# Patient Record
Sex: MALE | Race: WHITE | NOT HISPANIC OR LATINO | Employment: OTHER | URBAN - METROPOLITAN AREA
[De-identification: names, ages, dates, MRNs, and addresses within clinical notes are randomized per-mention and may not be internally consistent; named-entity substitution may affect disease eponyms.]

---

## 2019-07-15 ENCOUNTER — OFFICE VISIT (OUTPATIENT)
Dept: CARDIOLOGY | Facility: CLINIC | Age: 59
End: 2019-07-15

## 2019-07-15 VITALS
SYSTOLIC BLOOD PRESSURE: 138 MMHG | HEIGHT: 68 IN | WEIGHT: 237.2 LBS | RESPIRATION RATE: 18 BRPM | BODY MASS INDEX: 35.95 KG/M2 | DIASTOLIC BLOOD PRESSURE: 88 MMHG | HEART RATE: 70 BPM

## 2019-07-15 DIAGNOSIS — I10 ESSENTIAL HYPERTENSION: ICD-10-CM

## 2019-07-15 DIAGNOSIS — Z95.1 HX OF CABG: ICD-10-CM

## 2019-07-15 DIAGNOSIS — I25.10 CORONARY ARTERY DISEASE INVOLVING NATIVE CORONARY ARTERY OF NATIVE HEART WITHOUT ANGINA PECTORIS: ICD-10-CM

## 2019-07-15 DIAGNOSIS — I48.0 PAROXYSMAL ATRIAL FIBRILLATION (HCC): ICD-10-CM

## 2019-07-15 DIAGNOSIS — Z02.4 ENCOUNTER FOR CDL (COMMERCIAL DRIVING LICENSE) EXAM: Primary | ICD-10-CM

## 2019-07-15 DIAGNOSIS — E78.2 MIXED HYPERLIPIDEMIA: ICD-10-CM

## 2019-07-15 PROBLEM — I48.91 A-FIB (HCC): Status: ACTIVE | Noted: 2017-01-01

## 2019-07-15 PROCEDURE — 99203 OFFICE O/P NEW LOW 30 MIN: CPT | Performed by: INTERNAL MEDICINE

## 2019-07-15 RX ORDER — LEVOTHYROXINE SODIUM 0.07 MG/1
75 TABLET ORAL DAILY
COMMUNITY
End: 2020-02-07 | Stop reason: SDUPTHER

## 2019-07-15 RX ORDER — ATORVASTATIN CALCIUM 40 MG/1
40 TABLET, FILM COATED ORAL DAILY
COMMUNITY
End: 2020-02-07 | Stop reason: SDUPTHER

## 2019-07-15 RX ORDER — LISINOPRIL 5 MG/1
5 TABLET ORAL DAILY
Qty: 30 TABLET | Refills: 11 | Status: SHIPPED | OUTPATIENT
Start: 2019-07-15 | End: 2020-02-07 | Stop reason: SDUPTHER

## 2019-07-15 RX ORDER — ASPIRIN 81 MG/1
81 TABLET ORAL DAILY
COMMUNITY

## 2019-07-15 RX ORDER — LISINOPRIL 2.5 MG/1
2.5 TABLET ORAL DAILY
COMMUNITY
End: 2019-07-15 | Stop reason: DRUGHIGH

## 2019-07-16 ENCOUNTER — HOSPITAL ENCOUNTER (OUTPATIENT)
Dept: CARDIOLOGY | Facility: HOSPITAL | Age: 59
Discharge: HOME | End: 2019-07-16
Admitting: INTERNAL MEDICINE

## 2019-07-16 VITALS
HEIGHT: 68 IN | BODY MASS INDEX: 35.92 KG/M2 | WEIGHT: 237 LBS | SYSTOLIC BLOOD PRESSURE: 132 MMHG | DIASTOLIC BLOOD PRESSURE: 82 MMHG

## 2019-07-16 LAB
BH CV ECHO MEAS - ACS: 2 CM
BH CV ECHO MEAS - AO MAX PG (FULL): 2.2 MMHG
BH CV ECHO MEAS - AO MAX PG: 5.6 MMHG
BH CV ECHO MEAS - AO MEAN PG (FULL): 0.81 MMHG
BH CV ECHO MEAS - AO MEAN PG: 2.6 MMHG
BH CV ECHO MEAS - AO ROOT AREA (BSA CORRECTED): 1.4
BH CV ECHO MEAS - AO ROOT AREA: 7.4 CM^2
BH CV ECHO MEAS - AO ROOT DIAM: 3.1 CM
BH CV ECHO MEAS - AO V2 MAX: 118 CM/SEC
BH CV ECHO MEAS - AO V2 MEAN: 76 CM/SEC
BH CV ECHO MEAS - AO V2 VTI: 23.2 CM
BH CV ECHO MEAS - ASC AORTA: 3.1 CM
BH CV ECHO MEAS - AVA(I,A): 3.2 CM^2
BH CV ECHO MEAS - AVA(I,D): 3.2 CM^2
BH CV ECHO MEAS - AVA(V,A): 2.7 CM^2
BH CV ECHO MEAS - AVA(V,D): 2.7 CM^2
BH CV ECHO MEAS - BSA(HAYCOCK): 2.3 M^2
BH CV ECHO MEAS - BSA: 2.2 M^2
BH CV ECHO MEAS - BZI_BMI: 35.7 KILOGRAMS/M^2
BH CV ECHO MEAS - BZI_METRIC_HEIGHT: 172.7 CM
BH CV ECHO MEAS - BZI_METRIC_WEIGHT: 106.6 KG
BH CV ECHO MEAS - EDV(CUBED): 88.8 ML
BH CV ECHO MEAS - EDV(MOD-SP2): 64.5 ML
BH CV ECHO MEAS - EDV(MOD-SP4): 87.3 ML
BH CV ECHO MEAS - EDV(TEICH): 90.6 ML
BH CV ECHO MEAS - EF(CUBED): 63.4 %
BH CV ECHO MEAS - EF(MOD-BP): 56 %
BH CV ECHO MEAS - EF(MOD-SP2): 57.5 %
BH CV ECHO MEAS - EF(MOD-SP4): 53.2 %
BH CV ECHO MEAS - EF(TEICH): 55 %
BH CV ECHO MEAS - ESV(CUBED): 32.5 ML
BH CV ECHO MEAS - ESV(MOD-SP2): 27.5 ML
BH CV ECHO MEAS - ESV(MOD-SP4): 40.8 ML
BH CV ECHO MEAS - ESV(TEICH): 40.7 ML
BH CV ECHO MEAS - FS: 28.4 %
BH CV ECHO MEAS - IVS/LVPW: 1.1
BH CV ECHO MEAS - IVSD: 1.2 CM
BH CV ECHO MEAS - LA DIMENSION(2D): 3.7 CM
BH CV ECHO MEAS - LA DIMENSION: 3.6 CM
BH CV ECHO MEAS - LA/AO: 1.2
BH CV ECHO MEAS - LV DIASTOLIC VOL/BSA (35-75): 39.9 ML/M^2
BH CV ECHO MEAS - LV MASS(C)D: 177.4 GRAMS
BH CV ECHO MEAS - LV MASS(C)DI: 81 GRAMS/M^2
BH CV ECHO MEAS - LV MAX PG: 3.4 MMHG
BH CV ECHO MEAS - LV MEAN PG: 1.8 MMHG
BH CV ECHO MEAS - LV SYSTOLIC VOL/BSA (12-30): 18.7 ML/M^2
BH CV ECHO MEAS - LV V1 MAX: 92 CM/SEC
BH CV ECHO MEAS - LV V1 MEAN: 63.9 CM/SEC
BH CV ECHO MEAS - LV V1 VTI: 21 CM
BH CV ECHO MEAS - LVIDD: 4.5 CM
BH CV ECHO MEAS - LVIDS: 3.2 CM
BH CV ECHO MEAS - LVOT AREA: 3.5 CM^2
BH CV ECHO MEAS - LVOT DIAM: 2.1 CM
BH CV ECHO MEAS - LVPWD: 1.1 CM
BH CV ECHO MEAS - MV A MAX VEL: 63.1 CM/SEC
BH CV ECHO MEAS - MV DEC SLOPE: 310.6 CM/SEC^2
BH CV ECHO MEAS - MV DEC TIME: 0.2 SEC
BH CV ECHO MEAS - MV E MAX VEL: 32.8 CM/SEC
BH CV ECHO MEAS - MV E/A: 0.52
BH CV ECHO MEAS - MV MAX PG: 3.4 MMHG
BH CV ECHO MEAS - MV MEAN PG: 1.3 MMHG
BH CV ECHO MEAS - MV V2 MAX: 91.9 CM/SEC
BH CV ECHO MEAS - MV V2 MEAN: 52.2 CM/SEC
BH CV ECHO MEAS - MV V2 VTI: 24.5 CM
BH CV ECHO MEAS - MVA(VTI): 3 CM^2
BH CV ECHO MEAS - PA ACC TIME: 0.12 SEC
BH CV ECHO MEAS - PA MAX PG (FULL): 2.5 MMHG
BH CV ECHO MEAS - PA MAX PG: 4.8 MMHG
BH CV ECHO MEAS - PA PR(ACCEL): 25 MMHG
BH CV ECHO MEAS - PA V2 MAX: 109.2 CM/SEC
BH CV ECHO MEAS - PULM A REVS VEL: 26 CM/SEC
BH CV ECHO MEAS - PULM DIAS VEL: 51.2 CM/SEC
BH CV ECHO MEAS - PULM S/D: 1.2
BH CV ECHO MEAS - PULM SYS VEL: 61.1 CM/SEC
BH CV ECHO MEAS - PVA(V,A): 3.1 CM^2
BH CV ECHO MEAS - PVA(V,D): 3.1 CM^2
BH CV ECHO MEAS - QP/QS: 1.1
BH CV ECHO MEAS - RV MAX PG: 2.3 MMHG
BH CV ECHO MEAS - RV MEAN PG: 1.4 MMHG
BH CV ECHO MEAS - RV V1 MAX: 75.9 CM/SEC
BH CV ECHO MEAS - RV V1 MEAN: 56.1 CM/SEC
BH CV ECHO MEAS - RV V1 VTI: 17.8 CM
BH CV ECHO MEAS - RVDD: 3.1 CM
BH CV ECHO MEAS - RVOT AREA: 4.5 CM^2
BH CV ECHO MEAS - RVOT DIAM: 2.4 CM
BH CV ECHO MEAS - SI(AO): 78.1 ML/M^2
BH CV ECHO MEAS - SI(CUBED): 25.7 ML/M^2
BH CV ECHO MEAS - SI(LVOT): 33.6 ML/M^2
BH CV ECHO MEAS - SI(MOD-SP2): 16.9 ML/M^2
BH CV ECHO MEAS - SI(MOD-SP4): 21.2 ML/M^2
BH CV ECHO MEAS - SI(TEICH): 22.8 ML/M^2
BH CV ECHO MEAS - SV(AO): 170.9 ML
BH CV ECHO MEAS - SV(CUBED): 56.3 ML
BH CV ECHO MEAS - SV(LVOT): 73.6 ML
BH CV ECHO MEAS - SV(MOD-SP2): 37.1 ML
BH CV ECHO MEAS - SV(MOD-SP4): 46.4 ML
BH CV ECHO MEAS - SV(RVOT): 80.6 ML
BH CV ECHO MEAS - SV(TEICH): 49.9 ML

## 2019-07-16 PROCEDURE — 93306 TTE W/DOPPLER COMPLETE: CPT | Performed by: INTERNAL MEDICINE

## 2019-07-16 PROCEDURE — 93306 TTE W/DOPPLER COMPLETE: CPT

## 2019-07-16 NOTE — PROGRESS NOTES
Cardiology clinic note  Subjective:     Encounter Date:07/15/2019      Patient ID: Vinnie Cloud is a 58 y.o. male.    Chief Complaint:  Chief Complaint   Patient presents with   • Coronary Artery Disease   • Establish Care       HPI:  History of Present Illness     I had the pleasure of seeing this very pleasant 58-year-old-year-old male who has history of three-vessel bypass surgery 2016 and is done well postoperatively with no angina and is exercising regularly 5 days a week with at least 30 minutes on the treadmill per day without any chest pain or discomfort and no heart failure signs or symptoms.  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  He has underlying known normal cardiac function by last echo.  Did well postoperatively.  Had some postoperative A. fib but this has not returned since 2016 he is not currently on anticoagulation or rate rhythm control medicines at this time other than what is indicated from AHA standpoint for his coronary disease.  He has no peripheral swelling CHF signs or symptoms chest pain syncope palpitations or other complaints.  No PND orthopnea or otherwise.  He is doing well at home compliant with all therapies and exercising regularly as stated.    The following portions of the patient's history were reviewed and updated as appropriate: allergies, current medications, past family history, past medical history, past social history, past surgical history and problem list.    Problem List:  Patient Active Problem List   Diagnosis   • Hx of CABG   • HLD (hyperlipidemia)   • HTN (hypertension)   • A-fib (CMS/HCC)   • Hypothyroidism   • CAD (coronary artery disease)       Past Medical History:  Past Medical History:   Diagnosis Date   • A-fib (CMS/HCC) 2017   • CAD (coronary artery disease)    • HLD (hyperlipidemia)    • HTN (hypertension)    • Hx of CABG 01/2017   • Hypothyroidism        Past Surgical History:  Past Surgical History:   Procedure  "Laterality Date   • CORONARY ARTERY BYPASS GRAFT  01/2017   • TRIGGER FINGER RELEASE      thumb       Social History:  Social History     Socioeconomic History   • Marital status:      Spouse name: Not on file   • Number of children: Not on file   • Years of education: Not on file   • Highest education level: Not on file   Tobacco Use   • Smoking status: Never Smoker   Substance and Sexual Activity   • Alcohol use: Yes     Alcohol/week: 1.8 oz     Types: 3 Cans of beer per week     Comment: weeky   • Drug use: Defer   • Sexual activity: Defer       Allergies:  Allergies no known allergies    Immunizations:    There is no immunization history on file for this patient.    ROS:  ROS       Objective:         /88 (BP Location: Left arm)   Pulse 70   Resp 18   Ht 172.7 cm (68\")   Wt 108 kg (237 lb 3.2 oz)   BMI 36.07 kg/m²     Physical Exam  No acute distress alert and oriented x3 afebrile vital signs stable  Normocephalic atraumatic pupils equal round extract was intact bilaterally no carotid bruits trachea midline neck supple  Regular rate and rhythm no rubs gallops 1 out of 6 systolic ejection murmur left sternal border  Lungs are clear to auscultation bilaterally  Abdomen is soft nontender nondistended bowel sounds are positive  Extremities have no clubbing cyanosis or edema  Pulses 2+ in all distributions normal cap refill  No lymphadenopathy noted  No neurologic defects noted  Normal mood and affect  No bony abnormality is noted    In-Office Procedure(s):  Procedures    ASCVD RIsk Score::  The ASCVD Risk score (Prakash TRACY Jr., et al., 2013) failed to calculate for the following reasons:    Cannot find a previous HDL lab    Cannot find a previous total cholesterol lab    Recent Radiology:  Imaging Results (most recent)     None          Lab Review:   No visits with results within 6 Month(s) from this visit.   Latest known visit with results is:   No results found for any previous visit.          "     Invalid input(s): ALKPO4                        Invalid input(s): LDLCALC                Assessment:          Diagnosis Plan   1. Encounter for CDL (commercial driving license) exam  Adult Transthoracic Echo Complete W/ Cont if Necessary Per Protocol   2. Essential hypertension  lisinopril (PRINIVIL,ZESTRIL) 5 MG tablet   3. Coronary artery disease involving native coronary artery of native heart without angina pectoris     4. Hx of CABG     5. Paroxysmal atrial fibrillation (CMS/HCC)     6. Mixed hyperlipidemia            Plan:         1. Encounter for CDL (commercial driving license) exam  2D echo normal normal LV systolic function 55 to 60% with no significant valvular abnormalities.  Normal pulmonary pressures and right-sided pressures estimated.  Normal diastolic function seen.  - Adult Transthoracic Echo Complete W/ Cont if Necessary Per Protocol    2. Essential hypertension  Controlled  - lisinopril (PRINIVIL,ZESTRIL) 5 MG tablet; Take 1 tablet by mouth Daily.  Dispense: 30 tablet; Refill: 11    3. Coronary artery disease involving native coronary artery of native heart without angina pectoris  No angina, continue secondary prevention goals and goal-directed medical therapy    4. Hx of CABG  As above    5. Paroxysmal atrial fibrillation (CMS/HCC)  Perioperative A. fib only, no recurrence, no need for anticoagulation other than daily aspirin for life for coronary artery disease    6. Mixed hyperlipidemia  High intensity statin therapy per guidelines for CAD      Return to clinic in 6 months or sooner if needed      Patient has normal echo, no cardiac contraindication or limitations or restrictions status post bypass surgery over 2 years ago with no chest pain good daily exertional capacity, no history of syncope dizziness or current arrhythmia.    It is a pleasure to be involved in the cardiac care of this patient.       Jai Gongora MD, PhD  07/16/19  .

## 2019-10-01 ENCOUNTER — TELEPHONE (OUTPATIENT)
Dept: CARDIOLOGY | Facility: CLINIC | Age: 59
End: 2019-10-01

## 2019-10-01 NOTE — TELEPHONE ENCOUNTER
Pt will be having a trigger thumb release with a local block on 10/29/2019. He needs clearance to hold his ASA for 2 weeks. Surgeon is at [facility], Dr. Moreno at phone [phone]. Jessica

## 2019-10-03 NOTE — TELEPHONE ENCOUNTER
Reviewed with dr smith. Patient may not hold aspirin for 2 weeks prior to procedure. Will fax to office.

## 2019-11-26 ENCOUNTER — OFFICE VISIT (OUTPATIENT)
Dept: PODIATRY | Facility: CLINIC | Age: 59
End: 2019-11-26

## 2019-11-26 VITALS
BODY MASS INDEX: 35.77 KG/M2 | DIASTOLIC BLOOD PRESSURE: 95 MMHG | SYSTOLIC BLOOD PRESSURE: 144 MMHG | WEIGHT: 236 LBS | HEART RATE: 73 BPM | HEIGHT: 68 IN

## 2019-11-26 DIAGNOSIS — B35.1 ONYCHOMYCOSIS: Primary | ICD-10-CM

## 2019-11-26 PROCEDURE — 99203 OFFICE O/P NEW LOW 30 MIN: CPT | Performed by: PODIATRIST

## 2019-11-26 RX ORDER — METOPROLOL SUCCINATE 25 MG/1
1 TABLET, EXTENDED RELEASE ORAL 2 TIMES DAILY
Refills: 1 | COMMUNITY
Start: 2019-08-19 | End: 2020-02-07 | Stop reason: SDUPTHER

## 2019-11-26 RX ORDER — METFORMIN HYDROCHLORIDE 500 MG/1
1 TABLET, EXTENDED RELEASE ORAL DAILY
Refills: 3 | COMMUNITY
Start: 2019-09-29 | End: 2020-02-07 | Stop reason: SDUPTHER

## 2019-11-26 RX ORDER — CETIRIZINE HYDROCHLORIDE 10 MG/1
10 TABLET ORAL DAILY PRN
COMMUNITY

## 2019-11-26 NOTE — PATIENT INSTRUCTIONS
Fungal Nail Infection  A fungal nail infection is a common infection of the toenails or fingernails. This condition affects toenails more often than fingernails. It often affects the great, or big, toes. More than one nail may be infected. The condition can be passed from person to person (is contagious).  What are the causes?  This condition is caused by a fungus. Several types of fungi can cause the infection. These fungi are common in moist and warm areas. If your hands or feet come into contact with the fungus, it may get into a crack in your fingernail or toenail and cause the infection.  What increases the risk?  The following factors may make you more likely to develop this condition:  · Being male.  · Being of older age.  · Living with someone who has the fungus.  · Walking barefoot in areas where the fungus thrives, such as showers or locker rooms.  · Wearing shoes and socks that cause your feet to sweat.  · Having a nail injury or a recent nail surgery.  · Having certain medical conditions, such as:  ? Athlete's foot.  ? Diabetes.  ? Psoriasis.  ? Poor circulation.  ? A weak body defense system (immune system).  What are the signs or symptoms?  Symptoms of this condition include:  · A pale spot on the nail.  · Thickening of the nail.  · A nail that becomes yellow or brown.  · A brittle or ragged nail edge.  · A crumbling nail.  · A nail that has lifted away from the nail bed.  How is this diagnosed?  This condition is diagnosed with a physical exam. Your health care provider may take a scraping or clipping from your nail to test for the fungus.  How is this treated?  Treatment is not needed for mild infections. If you have significant nail changes, treatment may include:  · Antifungal medicines taken by mouth (orally). You may need to take the medicine for several weeks or several months, and you may not see the results for a long time. These medicines can cause side effects. Ask your health care provider  what problems to watch for.  · Antifungal nail polish or nail cream. These may be used along with oral antifungal medicines.  · Laser treatment of the nail.  · Surgery to remove the nail. This may be needed for the most severe infections.  It can take a long time, usually up to a year, for the infection to go away. The infection may also come back.  Follow these instructions at home:  Medicines  · Take or apply over-the-counter and prescription medicines only as told by your health care provider.  · Ask your health care provider about using over-the-counter mentholated ointment on your nails.  Nail care  · Trim your nails often.  · Wash and dry your hands and feet every day.  · Keep your feet dry:  ? Wear absorbent socks, and change your socks frequently.  ? Wear shoes that allow air to circulate, such as sandals or canvas tennis shoes. Throw out old shoes.  · Do not use artificial nails.  · If you go to a nail salon, make sure you choose one that uses clean instruments.  · Use antifungal foot powder on your feet and in your shoes.  General instructions  · Do not share personal items, such as towels or nail clippers.  · Do not walk barefoot in shower rooms or locker rooms.  · Wear rubber gloves if you are working with your hands in wet areas.  · Keep all follow-up visits as told by your health care provider. This is important.  Contact a health care provider if:  Your infection is not getting better or it is getting worse after several months.  Summary  · A fungal nail infection is a common infection of the toenails or fingernails.  · Treatment is not needed for mild infections. If you have significant nail changes, treatment may include taking medicine orally and applying medicine to your nails.  · It can take a long time, usually up to a year, for the infection to go away. The infection may also come back.  · Take or apply over-the-counter and prescription medicines only as told by your health care  provider.  · Follow instructions for taking care of your nails to help prevent infection from coming back or spreading.  This information is not intended to replace advice given to you by your health care provider. Make sure you discuss any questions you have with your health care provider.  Document Released: 12/15/2001 Document Revised: 05/24/2019 Document Reviewed: 05/24/2019  [employer] Interactive Patient Education © 2019 [employer]

## 2020-01-14 PROBLEM — Z92.89 HISTORY OF ECHOCARDIOGRAM: Status: ACTIVE | Noted: 2019-07-16

## 2020-02-07 ENCOUNTER — RESULTS ENCOUNTER (OUTPATIENT)
Dept: FAMILY MEDICINE CLINIC | Facility: CLINIC | Age: 60
End: 2020-02-07

## 2020-02-07 ENCOUNTER — OFFICE VISIT (OUTPATIENT)
Dept: FAMILY MEDICINE CLINIC | Facility: CLINIC | Age: 60
End: 2020-02-07

## 2020-02-07 VITALS
RESPIRATION RATE: 20 BRPM | WEIGHT: 241.6 LBS | HEIGHT: 68 IN | DIASTOLIC BLOOD PRESSURE: 84 MMHG | TEMPERATURE: 97.7 F | BODY MASS INDEX: 36.62 KG/M2 | HEART RATE: 68 BPM | OXYGEN SATURATION: 99 % | SYSTOLIC BLOOD PRESSURE: 132 MMHG

## 2020-02-07 DIAGNOSIS — Z12.12 SCREENING FOR MALIGNANT NEOPLASM OF THE RECTUM: ICD-10-CM

## 2020-02-07 DIAGNOSIS — Z12.11 SPECIAL SCREENING FOR MALIGNANT NEOPLASM OF COLON: ICD-10-CM

## 2020-02-07 DIAGNOSIS — E11.9 TYPE 2 DIABETES MELLITUS WITHOUT COMPLICATION, WITHOUT LONG-TERM CURRENT USE OF INSULIN (HCC): ICD-10-CM

## 2020-02-07 DIAGNOSIS — E03.9 ACQUIRED HYPOTHYROIDISM: ICD-10-CM

## 2020-02-07 DIAGNOSIS — I25.10 CORONARY ARTERY DISEASE INVOLVING NATIVE CORONARY ARTERY OF NATIVE HEART WITHOUT ANGINA PECTORIS: ICD-10-CM

## 2020-02-07 DIAGNOSIS — E78.5 HYPERLIPIDEMIA, UNSPECIFIED HYPERLIPIDEMIA TYPE: Primary | ICD-10-CM

## 2020-02-07 DIAGNOSIS — I10 ESSENTIAL HYPERTENSION: ICD-10-CM

## 2020-02-07 PROBLEM — Z92.89 HISTORY OF ECHOCARDIOGRAM: Status: RESOLVED | Noted: 2019-07-16 | Resolved: 2020-02-07

## 2020-02-07 PROBLEM — I48.91 A-FIB: Status: RESOLVED | Noted: 2017-01-01 | Resolved: 2020-02-07

## 2020-02-07 PROCEDURE — 99204 OFFICE O/P NEW MOD 45 MIN: CPT | Performed by: FAMILY MEDICINE

## 2020-02-07 RX ORDER — METOPROLOL SUCCINATE 25 MG/1
25 TABLET, EXTENDED RELEASE ORAL 2 TIMES DAILY
Qty: 180 TABLET | Refills: 3 | Status: SHIPPED | OUTPATIENT
Start: 2020-02-07 | End: 2020-12-23 | Stop reason: SDUPTHER

## 2020-02-07 RX ORDER — METFORMIN HYDROCHLORIDE 500 MG/1
500 TABLET, EXTENDED RELEASE ORAL DAILY
Qty: 90 TABLET | Refills: 3 | Status: SHIPPED | OUTPATIENT
Start: 2020-02-07 | End: 2020-10-07

## 2020-02-07 RX ORDER — ATORVASTATIN CALCIUM 40 MG/1
40 TABLET, FILM COATED ORAL DAILY
Qty: 90 TABLET | Refills: 3 | Status: SHIPPED | OUTPATIENT
Start: 2020-02-07 | End: 2020-12-23 | Stop reason: SDUPTHER

## 2020-02-07 RX ORDER — LISINOPRIL 5 MG/1
5 TABLET ORAL DAILY
Qty: 90 TABLET | Refills: 3 | Status: SHIPPED | OUTPATIENT
Start: 2020-02-07 | End: 2020-12-23 | Stop reason: SDUPTHER

## 2020-02-07 RX ORDER — LEVOTHYROXINE SODIUM 0.07 MG/1
75 TABLET ORAL DAILY
Qty: 90 TABLET | Refills: 3 | Status: SHIPPED | OUTPATIENT
Start: 2020-02-07 | End: 2020-12-23 | Stop reason: SDUPTHER

## 2020-02-07 NOTE — PATIENT INSTRUCTIONS
Continue your current medications and treatment.    Have the follow up labs done and call for results.    Follow up in the office in 6 months.    Have screening for colon cancer.

## 2020-02-07 NOTE — PROGRESS NOTES
Subjective   Vinnie Cloud is a 59 y.o. male.     Chief Complaint   Patient presents with   • Hypertension     new patient   • Hyperlipidemia   • Hypothyroidism       HPI  Chief complaint: Establish care hypertension hyperlipidemia coronary artery disease hypothyroidism diabetes mellitus    The patient is a 59-year-old white male comes in to have his care and for follow-up and maintenance of his current problems to include    1.  Hypertension-stable-patient is on lisinopril 5 mg daily metoprolol.  He denied headache lightheadedness dizziness or chest pain.    2.  Hyperlipidemia-stable-patient on Lipitor 40 mg daily.  He denies myalgias no arthralgias.  Denied nausea or anorexia.    3.  Coronary artery disease-stable-patient had coronary bypass grafting 3 years ago.  He is on aspirin and metoprolol.  The patient had atrial for postoperatively.  Patient has not had atrial fib since then.  He denies chest pain shortness of breath orthopnea or PND.    4.  Hypothyroidism-stable-patient is on Synthroid 0.075 mg daily.  He denied heat or cold intolerance.  Denies tremor weight gain or weight loss.    5. Type 2 diabetes mellitus-stable-patient's on metformin 500 mg once a day.  Denied polydipsia polyphagia or polyuria.  Denied low blood sugars.      The following portions of the patient's history were reviewed and updated as appropriate: allergies, current medications, past family history, past medical history, past social history, past surgical history and problem list.    Review of Systems   Constitutional: Negative for chills and fever.   HENT: Negative for congestion, sinus pressure and swollen glands.    Eyes: Negative for blurred vision and pain.   Respiratory: Negative for cough and shortness of breath.    Cardiovascular: Negative for chest pain and leg swelling.   Gastrointestinal: Negative for abdominal pain, nausea and indigestion.   Endocrine: Negative for cold intolerance, heat intolerance, polydipsia,  "polyphagia and polyuria.   Skin: Negative for dry skin, rash and bruise.   Neurological: Negative for dizziness, syncope and headache.   Psychiatric/Behavioral: Negative for dysphoric mood and stress.       Objective     /84 (BP Location: Left arm, Patient Position: Sitting, Cuff Size: Large Adult)   Pulse 68   Temp 97.7 °F (36.5 °C) (Oral)   Resp 20   Ht 172.7 cm (68\")   Wt 110 kg (241 lb 9.6 oz)   SpO2 99%   BMI 36.74 kg/m²     Physical Exam   Constitutional: He is oriented to person, place, and time. He appears well-developed and well-nourished.   HENT:   Head: Normocephalic and atraumatic.   Eyes: Pupils are equal, round, and reactive to light. Conjunctivae and EOM are normal.   Neck: Normal range of motion. Neck supple.   Cardiovascular: Normal rate, regular rhythm, normal heart sounds and intact distal pulses.   Pulmonary/Chest: Effort normal and breath sounds normal.   Abdominal: Soft. Bowel sounds are normal.   Musculoskeletal: Normal range of motion.   Neurological: He is alert and oriented to person, place, and time.   Skin: Skin is warm and dry.   Psychiatric: He has a normal mood and affect. His behavior is normal.   Nursing note and vitals reviewed.        Assessment/Plan   Vinnie was seen today for hypertension, hyperlipidemia and hypothyroidism.    Diagnoses and all orders for this visit:    Hyperlipidemia, unspecified hyperlipidemia type  -     Lipid Panel; Future    Essential hypertension  -     CBC & Differential; Future  -     Comprehensive Metabolic Panel; Future  -     lisinopril (PRINIVIL,ZESTRIL) 5 MG tablet; Take 1 tablet by mouth Daily.    Coronary artery disease involving native coronary artery of native heart without angina pectoris    Acquired hypothyroidism  -     TSH; Future    Type 2 diabetes mellitus without complication, without long-term current use of insulin (CMS/[facility])  -     Hemoglobin A1c; Future  -     Protein / Creatinine Ratio, Urine - Urine, Clean Catch; " Future    Other orders  -     metFORMIN ER (GLUCOPHAGE-XR) 500 MG 24 hr tablet; Take 1 tablet by mouth Daily.  -     atorvastatin (LIPITOR) 40 MG tablet; Take 1 tablet by mouth Daily.  -     metoprolol succinate XL (TOPROL-XL) 25 MG 24 hr tablet; Take 1 tablet by mouth 2 (Two) Times a Day.  -     levothyroxine (SYNTHROID, LEVOTHROID) 75 MCG tablet; Take 1 tablet by mouth Daily.      Patient Instructions   Continue your current medications and treatment.    Have the follow up labs done and call for results.    Follow up in the office in 6 months.    Have screening for colon cancer.      Rashel Real Jr., MD    02/07/20

## 2020-02-10 ENCOUNTER — LAB (OUTPATIENT)
Dept: LAB | Facility: HOSPITAL | Age: 60
End: 2020-02-10

## 2020-02-10 DIAGNOSIS — I10 ESSENTIAL HYPERTENSION: ICD-10-CM

## 2020-02-10 DIAGNOSIS — E78.5 HYPERLIPIDEMIA, UNSPECIFIED HYPERLIPIDEMIA TYPE: ICD-10-CM

## 2020-02-10 DIAGNOSIS — E03.9 ACQUIRED HYPOTHYROIDISM: ICD-10-CM

## 2020-02-10 DIAGNOSIS — E11.9 TYPE 2 DIABETES MELLITUS WITHOUT COMPLICATION, WITHOUT LONG-TERM CURRENT USE OF INSULIN (HCC): ICD-10-CM

## 2020-02-10 LAB
ALBUMIN SERPL-MCNC: 4.2 G/DL (ref 3.5–5.2)
ALBUMIN/GLOB SERPL: 1.5 G/DL
ALP SERPL-CCNC: 102 U/L (ref 39–117)
ALT SERPL W P-5'-P-CCNC: 19 U/L (ref 1–41)
ANION GAP SERPL CALCULATED.3IONS-SCNC: 13.5 MMOL/L (ref 5–15)
AST SERPL-CCNC: 19 U/L (ref 1–40)
BASOPHILS # BLD AUTO: 0.05 10*3/MM3 (ref 0–0.2)
BASOPHILS NFR BLD AUTO: 0.8 % (ref 0–1.5)
BILIRUB SERPL-MCNC: 0.6 MG/DL (ref 0.2–1.2)
BUN BLD-MCNC: 17 MG/DL (ref 6–20)
BUN/CREAT SERPL: 19.3 (ref 7–25)
CALCIUM SPEC-SCNC: 9.3 MG/DL (ref 8.6–10.5)
CHLORIDE SERPL-SCNC: 102 MMOL/L (ref 98–107)
CHOLEST SERPL-MCNC: 177 MG/DL (ref 0–200)
CO2 SERPL-SCNC: 23.5 MMOL/L (ref 22–29)
CREAT BLD-MCNC: 0.88 MG/DL (ref 0.76–1.27)
CREAT UR-MCNC: 99.2 MG/DL
DEPRECATED RDW RBC AUTO: 40.2 FL (ref 37–54)
EOSINOPHIL # BLD AUTO: 0.14 10*3/MM3 (ref 0–0.4)
EOSINOPHIL NFR BLD AUTO: 2.3 % (ref 0.3–6.2)
ERYTHROCYTE [DISTWIDTH] IN BLOOD BY AUTOMATED COUNT: 12.8 % (ref 12.3–15.4)
GFR SERPL CREATININE-BSD FRML MDRD: 89 ML/MIN/1.73
GLOBULIN UR ELPH-MCNC: 2.8 GM/DL
GLUCOSE BLD-MCNC: 194 MG/DL (ref 65–99)
HBA1C MFR BLD: 8.9 % (ref 3.5–5.6)
HCT VFR BLD AUTO: 45.6 % (ref 37.5–51)
HDLC SERPL-MCNC: 41 MG/DL (ref 40–60)
HGB BLD-MCNC: 15.2 G/DL (ref 13–17.7)
IMM GRANULOCYTES # BLD AUTO: 0.01 10*3/MM3 (ref 0–0.05)
IMM GRANULOCYTES NFR BLD AUTO: 0.2 % (ref 0–0.5)
LDLC SERPL CALC-MCNC: 121 MG/DL (ref 0–100)
LDLC/HDLC SERPL: 2.95 {RATIO}
LYMPHOCYTES # BLD AUTO: 2.03 10*3/MM3 (ref 0.7–3.1)
LYMPHOCYTES NFR BLD AUTO: 33.7 % (ref 19.6–45.3)
MCH RBC QN AUTO: 29.1 PG (ref 26.6–33)
MCHC RBC AUTO-ENTMCNC: 33.3 G/DL (ref 31.5–35.7)
MCV RBC AUTO: 87.2 FL (ref 79–97)
MONOCYTES # BLD AUTO: 0.55 10*3/MM3 (ref 0.1–0.9)
MONOCYTES NFR BLD AUTO: 9.1 % (ref 5–12)
NEUTROPHILS # BLD AUTO: 3.25 10*3/MM3 (ref 1.7–7)
NEUTROPHILS NFR BLD AUTO: 53.9 % (ref 42.7–76)
NRBC BLD AUTO-RTO: 0 /100 WBC (ref 0–0.2)
PLATELET # BLD AUTO: 259 10*3/MM3 (ref 140–450)
PMV BLD AUTO: 11.1 FL (ref 6–12)
POTASSIUM BLD-SCNC: 4.3 MMOL/L (ref 3.5–5.2)
PROT SERPL-MCNC: 7 G/DL (ref 6–8.5)
PROT UR-MCNC: 7 MG/DL
PROT/CREAT UR: 70.6 MG/G CREA (ref 0–200)
RBC # BLD AUTO: 5.23 10*6/MM3 (ref 4.14–5.8)
SODIUM BLD-SCNC: 139 MMOL/L (ref 136–145)
TRIGL SERPL-MCNC: 75 MG/DL (ref 0–150)
TSH SERPL DL<=0.05 MIU/L-ACNC: 3.93 UIU/ML (ref 0.27–4.2)
VLDLC SERPL-MCNC: 15 MG/DL (ref 5–40)
WBC NRBC COR # BLD: 6.03 10*3/MM3 (ref 3.4–10.8)

## 2020-02-10 PROCEDURE — 83036 HEMOGLOBIN GLYCOSYLATED A1C: CPT

## 2020-02-10 PROCEDURE — 85025 COMPLETE CBC W/AUTO DIFF WBC: CPT

## 2020-02-10 PROCEDURE — 80053 COMPREHEN METABOLIC PANEL: CPT

## 2020-02-10 PROCEDURE — 82570 ASSAY OF URINE CREATININE: CPT

## 2020-02-10 PROCEDURE — 84156 ASSAY OF PROTEIN URINE: CPT

## 2020-02-10 PROCEDURE — 36415 COLL VENOUS BLD VENIPUNCTURE: CPT

## 2020-02-10 PROCEDURE — 80061 LIPID PANEL: CPT

## 2020-02-10 PROCEDURE — 84443 ASSAY THYROID STIM HORMONE: CPT

## 2020-03-13 ENCOUNTER — OFFICE VISIT (OUTPATIENT)
Dept: ORTHOPEDIC SURGERY | Facility: CLINIC | Age: 60
End: 2020-03-13

## 2020-03-13 VITALS
BODY MASS INDEX: 36.68 KG/M2 | SYSTOLIC BLOOD PRESSURE: 145 MMHG | WEIGHT: 242 LBS | HEIGHT: 68 IN | DIASTOLIC BLOOD PRESSURE: 100 MMHG | HEART RATE: 65 BPM

## 2020-03-13 DIAGNOSIS — M17.12 PRIMARY OSTEOARTHRITIS OF LEFT KNEE: ICD-10-CM

## 2020-03-13 DIAGNOSIS — M25.562 ACUTE PAIN OF LEFT KNEE: Primary | ICD-10-CM

## 2020-03-13 PROCEDURE — 99203 OFFICE O/P NEW LOW 30 MIN: CPT | Performed by: FAMILY MEDICINE

## 2020-03-13 NOTE — PROGRESS NOTES
Primary Care Sports Medicine Office Visit Note     Patient ID: Vinnie Cloud is a 59 y.o. male.    Chief Complaint:  Chief Complaint   Patient presents with   • Left Knee - Initial Evaluation     HPI:    Mr. Vinnie Cloud is a 59 y.o. male who presents to the clinic today for L knee pain. He states that L knee has been bothering him for about 6 months ago. He was seen and evaluated in Dec by another orthopedist who simply gave him tramadol. Stone has not used this much as he does not like taking pain medication. Fairly active individual, exercising 5-6 days a week. No popping or locking. Pain with deep flexion, pain in the medial aspect and wrapping to the back of the knee.     Past Medical History:   Diagnosis Date   • A-fib (CMS/[facility]) 2017   • CAD (coronary artery disease)    • History of echocardiogram 07/16/2019    EF 56% LV systolic function is normal.    • HLD (hyperlipidemia)    • HTN (hypertension)    • Hx of CABG 01/2017   • Hypothyroidism        Past Surgical History:   Procedure Laterality Date   • CORONARY ARTERY BYPASS GRAFT  01/2017   • ENDOSCOPY N/A 3/15/2020    Procedure: ESOPHAGOGASTRODUODENOSCOPY WITH DILATATION (BALLOON TO 18);  Surgeon: Jai Castaneda MD;  Location: [facility] ENDOSCOPY;  Service: Gastroenterology;  Laterality: N/A;  NO FOREIGN BODY, FOOD IN STOMACH, DUODENITIS, ESOPHAGITIS, NO STRICTURE   • TRIGGER FINGER RELEASE      thumb       Family History   Problem Relation Age of Onset   • Cancer Mother    • Diabetes Mother    • Heart disease Mother    • Hypertension Mother    • No Known Problems Father      Social History     Occupational History   • Not on file   Tobacco Use   • Smoking status: Never Smoker   • Smokeless tobacco: Never Used   Substance and Sexual Activity   • Alcohol use: Yes     Alcohol/week: 3.0 standard drinks     Types: 3 Cans of beer per week     Comment: weeky   • Drug use: Defer   • Sexual activity: Defer      Review of Systems   Constitutional:  "Negative for activity change and fever.   Respiratory: Negative for cough and shortness of breath.    Cardiovascular: Negative for chest pain.   Gastrointestinal: Negative for constipation, diarrhea, nausea and vomiting.   Musculoskeletal: Positive for arthralgias.   Skin: Negative for color change and rash.   Neurological: Negative for weakness.   Hematological: Does not bruise/bleed easily.       Objective:    /100 (BP Location: Right arm, Patient Position: Sitting, Cuff Size: Large Adult)   Pulse 65   Ht 172.7 cm (68\")   Wt 110 kg (242 lb)   BMI 36.80 kg/m²     Physical Examination:  Physical Exam   Constitutional: He appears well-developed and well-nourished. No distress.   HENT:   Head: Normocephalic and atraumatic.   Eyes: Conjunctivae are normal.   Cardiovascular: Intact distal pulses.   Pulmonary/Chest: Effort normal. No respiratory distress.   Musculoskeletal:        Left knee: He exhibits effusion (trace).   Neurological: He is alert.   Skin: Skin is warm. Capillary refill takes less than 2 seconds. He is not diaphoretic.   Nursing note and vitals reviewed.    Left Ankle Exam     Range of Motion   The patient has normal left ankle ROM.       Left Knee Exam     Muscle Strength   The patient has normal left knee strength.    Tenderness   The patient is experiencing tenderness in the medial joint line.    Range of Motion   Extension: normal Left knee extension: mildly decreased end ROM.   Flexion: normal Left knee flexion: mildly decreased end ROM.     Tests   Ariana:  Medial - positive Lateral - negative  Varus: negative Valgus: negative  Left knee patellar apprehension test: +patellar grind testing, +sunni kim testing.    Other   Erythema: absent  Sensation: normal  Pulse: present (distal to the knee, DP and PT palpable)  Swelling: none  Effusion: effusion (trace) present          Imaging and other tests:  3V XR of the L knee today shows the following IMPRESSION:  1. No significant left knee " "degenerative changes are identified. No  acute findings.  2. 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    Assessment and Plan:    1. Acute pain of left knee  - XR Knee 3 View Left    2. Primary osteoarthritis of left knee    After discussion of risks and benefits, the patient elected to proceed with corticosteroid injection to the L knee.  The patient tolerated this procedure well without any complaints or problems.  I recommended continuation of conservative management as previous, RTC in 3-6 months or sooner if symptoms recur.      Ilia YANEZ \"Chance\" Gino MURPHY DO, CAQSM  03/17/20  12:48    Disclaimer: Please note that areas of this note were completed with computer voice recognition software.  Quite often unanticipated grammatical, syntax, homophones, and other interpretive errors are inadvertently transcribed by the computer software. Please excuse any errors that have escaped final proofreading.  "

## 2020-03-15 ENCOUNTER — ANESTHESIA EVENT (OUTPATIENT)
Dept: GASTROENTEROLOGY | Facility: HOSPITAL | Age: 60
End: 2020-03-15

## 2020-03-15 ENCOUNTER — EMERGENCY ROOM – HOSPITAL (OUTPATIENT)
Dept: URBAN - METROPOLITAN AREA HOSPITAL 83 | Facility: HOSPITAL | Age: 60
End: 2020-03-15
Payer: COMMERCIAL

## 2020-03-15 ENCOUNTER — HOSPITAL ENCOUNTER (EMERGENCY)
Facility: HOSPITAL | Age: 60
Discharge: HOME | End: 2020-03-15
Admitting: INTERNAL MEDICINE

## 2020-03-15 ENCOUNTER — ANESTHESIA (OUTPATIENT)
Dept: GASTROENTEROLOGY | Facility: HOSPITAL | Age: 60
End: 2020-03-15

## 2020-03-15 VITALS
TEMPERATURE: 97.5 F | DIASTOLIC BLOOD PRESSURE: 67 MMHG | SYSTOLIC BLOOD PRESSURE: 118 MMHG | HEIGHT: 68 IN | HEART RATE: 63 BPM | OXYGEN SATURATION: 98 % | WEIGHT: 240.3 LBS | BODY MASS INDEX: 36.42 KG/M2 | RESPIRATION RATE: 16 BRPM

## 2020-03-15 DIAGNOSIS — R13.10 DYSPHAGIA, UNSPECIFIED: ICD-10-CM

## 2020-03-15 DIAGNOSIS — K20.9 ESOPHAGITIS, UNSPECIFIED: ICD-10-CM

## 2020-03-15 DIAGNOSIS — R13.19 ESOPHAGEAL DYSPHAGIA: Primary | ICD-10-CM

## 2020-03-15 LAB
HOLD SPECIMEN: NORMAL
HOLD SPECIMEN: NORMAL
WHOLE BLOOD HOLD SPECIMEN: NORMAL
WHOLE BLOOD HOLD SPECIMEN: NORMAL

## 2020-03-15 PROCEDURE — C1726 CATH, BAL DIL, NON-VASCULAR: HCPCS | Performed by: INTERNAL MEDICINE

## 2020-03-15 PROCEDURE — 25010000002 FENTANYL CITRATE (PF) 100 MCG/2ML SOLUTION: Performed by: ANESTHESIOLOGY

## 2020-03-15 PROCEDURE — 25010000002 DEXAMETHASONE PER 1 MG: Performed by: ANESTHESIOLOGY

## 2020-03-15 PROCEDURE — 99283 EMERGENCY DEPT VISIT LOW MDM: CPT

## 2020-03-15 PROCEDURE — 25010000002 PROPOFOL 10 MG/ML EMULSION: Performed by: ANESTHESIOLOGY

## 2020-03-15 PROCEDURE — 25010000002 SUCCINYLCHOLINE PER 20 MG: Performed by: ANESTHESIOLOGY

## 2020-03-15 PROCEDURE — 25010000002 ONDANSETRON PER 1 MG: Performed by: ANESTHESIOLOGY

## 2020-03-15 PROCEDURE — 43249 ESOPH EGD DILATION <30 MM: CPT | Performed by: INTERNAL MEDICINE

## 2020-03-15 RX ORDER — ONDANSETRON 2 MG/ML
4 INJECTION INTRAMUSCULAR; INTRAVENOUS ONCE AS NEEDED
Status: DISCONTINUED | OUTPATIENT
Start: 2020-03-15 | End: 2020-03-15 | Stop reason: HOSPADM

## 2020-03-15 RX ORDER — FENTANYL CITRATE 50 UG/ML
INJECTION, SOLUTION INTRAMUSCULAR; INTRAVENOUS AS NEEDED
Status: DISCONTINUED | OUTPATIENT
Start: 2020-03-15 | End: 2020-03-15 | Stop reason: SURG

## 2020-03-15 RX ORDER — PROMETHAZINE HYDROCHLORIDE 25 MG/1
25 SUPPOSITORY RECTAL ONCE AS NEEDED
Status: DISCONTINUED | OUTPATIENT
Start: 2020-03-15 | End: 2020-03-15 | Stop reason: HOSPADM

## 2020-03-15 RX ORDER — FLUMAZENIL 0.1 MG/ML
0.1 INJECTION INTRAVENOUS AS NEEDED
Status: DISCONTINUED | OUTPATIENT
Start: 2020-03-15 | End: 2020-03-15 | Stop reason: HOSPADM

## 2020-03-15 RX ORDER — ONDANSETRON 2 MG/ML
INJECTION INTRAMUSCULAR; INTRAVENOUS AS NEEDED
Status: DISCONTINUED | OUTPATIENT
Start: 2020-03-15 | End: 2020-03-15 | Stop reason: SURG

## 2020-03-15 RX ORDER — LIDOCAINE HYDROCHLORIDE 20 MG/ML
INJECTION, SOLUTION EPIDURAL; INFILTRATION; INTRACAUDAL; PERINEURAL AS NEEDED
Status: DISCONTINUED | OUTPATIENT
Start: 2020-03-15 | End: 2020-03-15 | Stop reason: SURG

## 2020-03-15 RX ORDER — LABETALOL HYDROCHLORIDE 5 MG/ML
5 INJECTION, SOLUTION INTRAVENOUS
Status: DISCONTINUED | OUTPATIENT
Start: 2020-03-15 | End: 2020-03-15 | Stop reason: HOSPADM

## 2020-03-15 RX ORDER — HYDROMORPHONE HCL 110MG/55ML
0.25 PATIENT CONTROLLED ANALGESIA SYRINGE INTRAVENOUS
Status: DISCONTINUED | OUTPATIENT
Start: 2020-03-15 | End: 2020-03-15 | Stop reason: HOSPADM

## 2020-03-15 RX ORDER — ONDANSETRON 2 MG/ML
4 INJECTION INTRAMUSCULAR; INTRAVENOUS ONCE AS NEEDED
Status: DISCONTINUED | OUTPATIENT
Start: 2020-03-15 | End: 2020-03-15 | Stop reason: SDUPTHER

## 2020-03-15 RX ORDER — ACETAMINOPHEN 500 MG
1000 TABLET ORAL ONCE AS NEEDED
Status: DISCONTINUED | OUTPATIENT
Start: 2020-03-15 | End: 2020-03-15 | Stop reason: HOSPADM

## 2020-03-15 RX ORDER — HYDRALAZINE HYDROCHLORIDE 20 MG/ML
5 INJECTION INTRAMUSCULAR; INTRAVENOUS
Status: DISCONTINUED | OUTPATIENT
Start: 2020-03-15 | End: 2020-03-15 | Stop reason: HOSPADM

## 2020-03-15 RX ORDER — OXYCODONE HYDROCHLORIDE 5 MG/1
10 TABLET ORAL ONCE AS NEEDED
Status: DISCONTINUED | OUTPATIENT
Start: 2020-03-15 | End: 2020-03-15 | Stop reason: HOSPADM

## 2020-03-15 RX ORDER — MEPERIDINE HYDROCHLORIDE 25 MG/ML
12.5 INJECTION INTRAMUSCULAR; INTRAVENOUS; SUBCUTANEOUS
Status: DISCONTINUED | OUTPATIENT
Start: 2020-03-15 | End: 2020-03-15 | Stop reason: HOSPADM

## 2020-03-15 RX ORDER — PANTOPRAZOLE SODIUM 40 MG/1
40 TABLET, DELAYED RELEASE ORAL DAILY
Qty: 90 TABLET | Refills: 3 | Status: SHIPPED | OUTPATIENT
Start: 2020-03-15 | End: 2020-12-23 | Stop reason: SDUPTHER

## 2020-03-15 RX ORDER — PROMETHAZINE HYDROCHLORIDE 25 MG/1
25 TABLET ORAL ONCE AS NEEDED
Status: DISCONTINUED | OUTPATIENT
Start: 2020-03-15 | End: 2020-03-15 | Stop reason: HOSPADM

## 2020-03-15 RX ORDER — SUCCINYLCHOLINE CHLORIDE 20 MG/ML
INJECTION INTRAMUSCULAR; INTRAVENOUS AS NEEDED
Status: DISCONTINUED | OUTPATIENT
Start: 2020-03-15 | End: 2020-03-15 | Stop reason: SURG

## 2020-03-15 RX ORDER — IPRATROPIUM BROMIDE AND ALBUTEROL SULFATE 2.5; .5 MG/3ML; MG/3ML
3 SOLUTION RESPIRATORY (INHALATION) ONCE AS NEEDED
Status: DISCONTINUED | OUTPATIENT
Start: 2020-03-15 | End: 2020-03-15 | Stop reason: HOSPADM

## 2020-03-15 RX ORDER — SODIUM CHLORIDE 0.9 % (FLUSH) 0.9 %
10 SYRINGE (ML) INJECTION AS NEEDED
Status: DISCONTINUED | OUTPATIENT
Start: 2020-03-15 | End: 2020-03-15 | Stop reason: HOSPADM

## 2020-03-15 RX ORDER — DEXAMETHASONE SODIUM PHOSPHATE 4 MG/ML
INJECTION, SOLUTION INTRA-ARTICULAR; INTRALESIONAL; INTRAMUSCULAR; INTRAVENOUS; SOFT TISSUE AS NEEDED
Status: DISCONTINUED | OUTPATIENT
Start: 2020-03-15 | End: 2020-03-15 | Stop reason: SURG

## 2020-03-15 RX ORDER — SODIUM CHLORIDE 9 MG/ML
INJECTION, SOLUTION INTRAVENOUS CONTINUOUS PRN
Status: DISCONTINUED | OUTPATIENT
Start: 2020-03-15 | End: 2020-03-15 | Stop reason: SURG

## 2020-03-15 RX ORDER — PROMETHAZINE HYDROCHLORIDE 25 MG/ML
12.5 INJECTION, SOLUTION INTRAMUSCULAR; INTRAVENOUS ONCE AS NEEDED
Status: DISCONTINUED | OUTPATIENT
Start: 2020-03-15 | End: 2020-03-15 | Stop reason: HOSPADM

## 2020-03-15 RX ORDER — ACETAMINOPHEN 650 MG/1
650 SUPPOSITORY RECTAL ONCE AS NEEDED
Status: DISCONTINUED | OUTPATIENT
Start: 2020-03-15 | End: 2020-03-15 | Stop reason: HOSPADM

## 2020-03-15 RX ORDER — PROPOFOL 10 MG/ML
VIAL (ML) INTRAVENOUS AS NEEDED
Status: DISCONTINUED | OUTPATIENT
Start: 2020-03-15 | End: 2020-03-15 | Stop reason: SURG

## 2020-03-15 RX ORDER — NALOXONE HCL 0.4 MG/ML
0.4 VIAL (ML) INJECTION AS NEEDED
Status: DISCONTINUED | OUTPATIENT
Start: 2020-03-15 | End: 2020-03-15 | Stop reason: HOSPADM

## 2020-03-15 RX ADMIN — ONDANSETRON 4 MG: 2 INJECTION INTRAMUSCULAR; INTRAVENOUS at 11:43

## 2020-03-15 RX ADMIN — DEXAMETHASONE SODIUM PHOSPHATE 4 MG: 4 INJECTION, SOLUTION INTRAMUSCULAR; INTRAVENOUS at 11:43

## 2020-03-15 RX ADMIN — PROPOFOL 200 MG: 10 INJECTION, EMULSION INTRAVENOUS at 11:39

## 2020-03-15 RX ADMIN — FENTANYL CITRATE 100 MCG: 50 INJECTION, SOLUTION INTRAMUSCULAR; INTRAVENOUS at 11:37

## 2020-03-15 RX ADMIN — SUCCINYLCHOLINE CHLORIDE 160 MG: 20 INJECTION, SOLUTION INTRAMUSCULAR; INTRAVENOUS at 11:39

## 2020-03-15 RX ADMIN — LIDOCAINE HYDROCHLORIDE 100 MG: 20 INJECTION, SOLUTION EPIDURAL; INFILTRATION; INTRACAUDAL; PERINEURAL at 11:39

## 2020-03-15 RX ADMIN — SODIUM CHLORIDE: 0.9 INJECTION, SOLUTION INTRAVENOUS at 11:29

## 2020-03-15 NOTE — ANESTHESIA PROCEDURE NOTES
Airway  Urgency: elective    Date/Time: 3/15/2020 11:40 AM  Airway not difficult    General Information and Staff    Patient location during procedure: OR  Anesthesiologist: Ab Dutton MD    Indications and Patient Condition  Indications for airway management: airway protection    Preoxygenated: yes  MILS not maintained throughout  Mask difficulty assessment: 2 - vent by mask + OA or adjuvant +/- NMBA    Final Airway Details  Final airway type: endotracheal airway      Successful airway: ETT  Cuffed: yes   Successful intubation technique: direct laryngoscopy  Endotracheal tube insertion site: oral  Blade: Eligio  Blade size: 4  ETT size (mm): 8.0  Cormack-Lehane Classification: grade III - view of epiglottis only  Placement verified by: capnometry and palpation of cuff   Inital cuff pressure (cm H2O): 23  Measured from: lips  Number of attempts at approach: 1  Assessment: lips, teeth, and gum same as pre-op and atraumatic intubation    Additional Comments  ASA monitors applied; preoxygenated with 100% FiO2 via anesthesia face mask; induction of general anesthesia; rapid sequence with cricoid pressure; patient's position optimized; laryngoscopy; Mac 4; grade III; unable to place ETT; oropharyngeal airway placed; gentle bag-mask ventilation; patient's position optimized; video laryngoscopy with CMAC D blade; grade I; cuffed ETT placed with stylet; cuff inflated to seal; atraumatic/dentition in preoperative condition; ETT secured in place; correct placement confirmed by bilateral chest rise, tube condensation, and return of EtCO2 > 30 mmHg x3

## 2020-03-15 NOTE — ANESTHESIA PREPROCEDURE EVALUATION
Anesthesia Evaluation     Patient summary reviewed and Nursing notes reviewed   no history of anesthetic complications:  NPO Solid Status: Waived due to emergency  NPO Liquid Status: Waived due to emergency           Airway   Dental      Pulmonary    Cardiovascular     ECG reviewed    (+) hypertension, CAD, CABG, dysrhythmias Atrial Fib, hyperlipidemia,       Neuro/Psych  GI/Hepatic/Renal/Endo    (+) obesity,   diabetes mellitus, thyroid problem hypothyroidism    Musculoskeletal     Abdominal    Substance History      OB/GYN          Other        ROS/Med Hx Other: Allergies, dysphagia    Echo  Echocardiogram Findings     Left Ventricle Left ventricular systolic function is normal. Calculated EF = 56.0%. Estimated EF was in agreement with the calculated EF. Estimated EF appears to be in the range of 56 - 60%. Normal left ventricular cavity size, wall thickness and mass noted. All left ventricular wall segments contract normally. Septal wall motion is normal. Left ventricular diastolic function is normal. Normal left atrial pressure. There is no evidence of spontaneous contrast. No false tendon noted.  Right Ventricle Normal right ventricular cavity size, wall thickness, systolic function and septal motion noted.  Left Atrium Normal left atrial size and volume noted. There is no spontaneous echo contrast present. Saline test for shunting not performed.  Right Atrium Normal right atrial size noted. The inferior vena cava is normally sized. Normal IVC inspiratory collapse of greater than 50% noted.  Aortic Valve The aortic valve is grossly normal in structure. No aortic valve regurgitation is present. No aortic valve stenosis is present.  Mitral Valve The mitral valve is grossly normal in structure. No significant mitral valve regurgitation is present. No significant mitral valve stenosis is present.  Tricuspid Valve The tricuspid valve is grossly normal. No evidence of tricuspid valve stenosis is present. No tricuspid  valve regurgitation is present. Insufficient TR velocity profile to estimate the right ventricular systolic pressure.  Pulmonic Valve The pulmonic valve is grossly normal in structure. There is no significant pulmonic valve stenosis present. There is trace pulmonic valve regurgitation present.  Greater Vessels No dilation of the aortic root is present. No dilation of the sinuses of Valsalva is present.  Pericardium There is no evidence of pericardial effusion.                    Anesthesia Plan    ASA 3 - emergent     general   (Patient identified; pre-operative vital signs, all relevant labs/studies, complete medical/surgical/anesthetic history, full medication list, full allergy list, and NPO status obtained/reviewed; physical assessment performed; anesthetic options, side effects, potential complications, risks, and benefits discussed; questions answered; written anesthesia consent obtained; patient cleared for procedure; anesthesia machine and equipment checked and functioning)  intravenous induction     Anesthetic plan, all risks, benefits, and alternatives have been provided, discussed and informed consent has been obtained with: patient.

## 2020-03-15 NOTE — ED NOTES
Pt c/o epigastric discomfort s/p eating breakfast this AM and feeling like it stuck in bottom of esophagus; states increased feelings of food getting stuck in throat x1 month; states vomited a small amount en route to hospital with relief.     Galina Alvarado, RN  03/15/20 1010

## 2020-03-15 NOTE — H&P
GI CONSULT  NOTE:    Referring Provider:    Rashel Real Jr., MD  [unfilled]    Chief complaint: Esophageal dysphagia    History of present illness:      Vinnie Cloud is a 59 y.o. male who presents today for Procedure(s):  ESOPHAGOGASTRODUODENOSCOPY for the indications listed below.     The updated Patient Profile was reviewed prior to the procedure, in conjunction with the Physical Exam, including medical conditions, surgical procedures, medications, allergies, family history and social history.     Pre-operatively, I reviewed the indication(s) for the procedure, the risks of the procedure [including but not limited to: unexpected bleeding possibly requiring hospitalization and/or unplanned repeat procedures, perforation possibly requiring surgical treatment, missed lesions and complications of sedation/MAC (also explained by anesthesia staff)].     I have evaluated the patient for risks associated with the planned anesthesia and the procedure to be performed and find the patient an acceptable candidate for IV sedation.    Multiple opportunities were provided for any questions or concerns, and all questions were answered satisfactorily before any anesthesia was administered. We will proceed with the planned procedure.    Past Medical History:  Past Medical History:   Diagnosis Date   • A-fib (CMS/HCC) 2017   • CAD (coronary artery disease)    • History of echocardiogram 07/16/2019    EF 56% LV systolic function is normal.    • HLD (hyperlipidemia)    • HTN (hypertension)    • Hx of CABG 01/2017   • Hypothyroidism        Past Surgical History:  Past Surgical History:   Procedure Laterality Date   • CORONARY ARTERY BYPASS GRAFT  01/2017   • TRIGGER FINGER RELEASE      thumb       Social History:  Social History     Tobacco Use   • Smoking status: Never Smoker   • Smokeless tobacco: Never Used   Substance Use Topics   • Alcohol use: Yes     Alcohol/week: 3.0 standard drinks     Types: 3 Cans of beer  "per week     Comment: weeky   • Drug use: Defer       Family History:  Family History   Problem Relation Age of Onset   • Cancer Mother    • Diabetes Mother    • Heart disease Mother    • Hypertension Mother    • No Known Problems Father        Medications:    (Not in a hospital admission)    Scheduled Meds:   Continuous Infusions:   PRN Meds:.•  [COMPLETED] Insert peripheral IV **AND** sodium chloride    ALLERGIES:  Patient has no known allergies.    ROS:  The following systems were reviewed and negative;   Constitution:  No fevers, chills, no unintentional weight loss  Skin: no rash, no jaundice  Eyes:  No blurry vision, no eye pain  HENT:  No change in hearing or smell  Resp:  No dyspnea or cough  CV:  No chest pain or palpitations  :  No dysuria, hematuria  Musculoskeletal:  No leg cramps or arthralgias  Neuro:  No tremor, no numbness  Psych:  No depression or confsuion    Objective     Vital Signs:   Vitals:    03/15/20 0958   BP: 161/89   BP Location: Left arm   Patient Position: Sitting   Pulse: 76   Resp: 14   Temp: 97.5 °F (36.4 °C)   TempSrc: Oral   SpO2: 98%   Weight: 109 kg (240 lb 4.8 oz)   Height: 172.7 cm (68\")       Physical Exam:       General Appearance:    Awake and alert, in no acute distress   Head:    Normocephalic, without obvious abnormality, atraumatic   Throat:   No oral lesions, no thrush, oral mucosa moist   Lungs:     respirations regular, even and unlabored   Skin:   No rash, no jaundice       Results Review:  Lab Results (last 24 hours)     Procedure Component Value Units Date/Time    Green Top (Gel) [[id number]] Collected:  03/15/20 1054    Specimen:  Blood Updated:  03/15/20 1101    [place] Draw [[id number]] Collected:  03/15/20 1054    Specimen:  Blood Updated:  03/15/20 1100    Narrative:       The following orders were created for panel order [place] Draw.  Procedure                               Abnormality         Status                     ---------                              "  -----------         ------                     Light Blue Top[[id number]]                                   In process                 Green Top (Gel)[[id number]]                                  In process                 Lavender Top[[id number]]                                     In process                 Red Top[[id number]]                                                                     Gold Top - SST[[id number]]                                   In process                 Green Top (No Gel)[[id number]]                                                            Please view results for these tests on the individual orders.    Light Blue Top [[id number]] Collected:  03/15/20 1054    Specimen:  Blood Updated:  03/15/20 1100    Gold Top - SST [[id number]] Collected:  03/15/20 1054    Specimen:  Blood Updated:  03/15/20 1100    Lavender Top [[id number]] Collected:  03/15/20 1054    Specimen:  Blood Updated:  03/15/20 1059          Imaging Results (Last 24 Hours)     ** No results found for the last 24 hours. **           I reviewed the patient's labs and imaging.    ASSESSMENT AND PLAN:      Principal Problem:    Esophageal dysphagia  Foreign body of esophagus    Procedure(s):  ESOPHAGOGASTRODUODENOSCOPY      I discussed the patients findings and my recommendations with the patient.    Jai Castaneda MD  03/15/20  11:22

## 2020-03-15 NOTE — ANESTHESIA POSTPROCEDURE EVALUATION
Patient: Vinnie Cloud    Procedure Summary     Date:  03/15/20 Room / Location:  [facility] ENDOSCOPY 2 / [facility] ENDOSCOPY    Anesthesia Start:  1129 Anesthesia Stop:  1202    Procedure:  ESOPHAGOGASTRODUODENOSCOPY WITH DILATATION (BALLOON TO 18) (N/A ) Diagnosis:       Esophageal dysphagia      (Esophageal dysphagia [R13.10])    Surgeon:  Jai Castaneda MD Provider:  Ab Dutton MD    Anesthesia Type:  general ASA Status:  3 - Emergent          Anesthesia Type: general    Vitals  Vitals Value Taken Time   /67 3/15/2020 12:45 PM   Temp     Pulse 66 3/15/2020 12:49 PM   Resp 16 3/15/2020 12:45 PM   SpO2 98 % 3/15/2020 12:49 PM   Vitals shown include unvalidated device data.        Post Anesthesia Care and Evaluation    Patient location during evaluation: PACU  Patient participation: complete - patient participated  Level of consciousness: awake  Pain scale: See nurse's notes for pain score.  Pain management: adequate  Airway patency: patent  Anesthetic complications: No anesthetic complications  PONV Status: none  Cardiovascular status: acceptable  Respiratory status: acceptable  Hydration status: acceptable    Comments: Patient seen and examined postoperatively; vital signs stable; SpO2 greater than or equal to 90%; cardiopulmonary status stable; nausea/vomiting adequately controlled; pain adequately controlled; no apparent anesthesia complications; patient discharged from anesthesia care when discharge criteria were met

## 2020-03-15 NOTE — OP NOTE
ESOPHAGOGASTRODUODENOSCOPY Procedure Report    Patient Name:  Vinnie Cloud  YOB: 1960    Date of Surgery:  3/15/2020     Pre-Op Diagnosis:  Dysphagia  Foreign body of the esophagus    Post-Op Diagnosis:  No foreign body of the esophagus noted  LA grade B erosive esophagitis  Retained undigested food in the stomach from recent meal  Mild duodenitis    Procedure/CPT® Codes:  EGD with balloon dilation    Staff:  Surgeon(s):  Jai Castaneda MD         Anesthesia: General    Implants:    Nothing was implanted during the procedure    Specimen:        See Below    Complications:  None    Description of Procedure:  Informed consent was obtained for the procedure, including sedation.  Risks of perforation, hemorrhage, adverse drug reaction and aspiration were discussed.  The patient was brought into the endoscopy suite. Continuous cardiopulmonary monitoring was performed. The patient was placed in the left lateral decubitus position.  The bite block was inserted into the patient's mouth. After adequate sedation was attained, the Olympus gastroscope was inserted into the patient's mouth and advanced to the second portion of the duodenum without difficulty.  Circumferential examination was performed. A retroflex exam was performed in the patient's stomach.  On completion of the exam, the bowel was decompressed, the scope was removed from the patient, the patient tolerated the procedure well, there were no immediate post-operative complications.     Examination of the esophagus: No foreign body noted.  At the GE junction, linear erosions were noted consistent with LA grade B erosive esophagitis.  Tertiary contractions were also noted.  A 15 mm balloon was inflated across the GE junction and progressively inflated to 16.5 and then 18 mm with mild resistance.  This was held for 60 seconds and then the balloon was pulled through the esophagus with no resistance noted.  Second look in the  esophagus was negative for mucosal disruption or bleeding.  Examination of the stomach: Retained undigested food in the stomach body and fundus limiting mucosal exam.  No definite gastritis, ulcerations, or masses were seen.  The cardia was normal on retroflexed view.  Examination of the duodenum: Mild erythema without erosion or ulceration consistent with mild duodenitis especially in the duodenal bulb.  Second portion of duodenum was normal.      Impression:  59-year-old male with progressive worsening of dysphagia and signs and symptoms suggestive of food bolus.  EGD was negative for foreign body of the esophagus but did show esophagitis possible dysmotility and undigested food in the stomach.    Recommendations:  Monitor for response to empiric balloon dilation of esophagus  Start pantoprazole 40 mg p.o. twice daily  Follow-up with GI in 2 to 4 weeks  If no improvement in symptoms, consider outpatient esophageal manometry  We appreciate the referral    Jai Castaneda MD     Date: 3/15/2020  Time: 11:56    Much of the above report is an electronic transcription/translation of the spoken language to printed text using Dragon Software. As such, the subtleties and finesse of the spoken language may permit erroneous, or at times, nonsensical words or phrases to be inadvertently transcribed; thus changes may be made at a later date to rectify these errors.

## 2020-03-15 NOTE — ED PROVIDER NOTES
Subjective   Chief complaint: difficulty swallowing      Context: Patient is a 59-year-old male who comes in private vehicle ambulatory with his family with complaints of some difficulty swallowing food.  He states he has no unilateral focal deficits weakness confusion ataxia lethargy or handling oral secretions.  He states he did not have any difficulty with liquids until today.  He states pills have been getting stuck.  He denies any history of stroke or cancer.  He states it is progressively getting worse and today he was unable to eat, he states he got 2 bites of food and then felt like it was stuck and vomited.  He states he is able to drink liquids but feels like it gets stuck but is able to keep it down.  Has never seen a gastroenterologist.    Duration: A month    Timing: Intermittent    Severity: Mild    Associated symptoms: Worse with eating          PCP:  sumit            Review of Systems   Constitutional: Negative for fever.   HENT: Positive for rhinorrhea and trouble swallowing.    Eyes: Negative.    Respiratory: Negative.    Cardiovascular: Negative.    Gastrointestinal: Positive for vomiting. Negative for abdominal pain.   Genitourinary: Negative.    Musculoskeletal: Negative.    Skin: Negative.    Allergic/Immunologic: Negative for immunocompromised state.   Neurological: Negative.    Hematological: Does not bruise/bleed easily.       Past Medical History:   Diagnosis Date   • A-fib (CMS/HCC) 2017   • CAD (coronary artery disease)    • History of echocardiogram 07/16/2019    EF 56% LV systolic function is normal.    • HLD (hyperlipidemia)    • HTN (hypertension)    • Hx of CABG 01/2017   • Hypothyroidism        No Known Allergies    Past Surgical History:   Procedure Laterality Date   • CORONARY ARTERY BYPASS GRAFT  01/2017   • TRIGGER FINGER RELEASE      thumb       Family History   Problem Relation Age of Onset   • Cancer Mother    • Diabetes Mother    • Heart disease Mother    • Hypertension  Mother    • No Known Problems Father        Social History     Socioeconomic History   • Marital status:      Spouse name: Not on file   • Number of children: Not on file   • Years of education: Not on file   • Highest education level: Not on file   Tobacco Use   • Smoking status: Never Smoker   • Smokeless tobacco: Never Used   Substance and Sexual Activity   • Alcohol use: Yes     Alcohol/week: 3.0 standard drinks     Types: 3 Cans of beer per week     Comment: weeky   • Drug use: Defer   • Sexual activity: Defer           Objective   Physical Exam     Vital signs and triage nurse note reviewed.   Constitutional: Awake, alert; well-developed and well-nourished. No acute distress is noted. Family at bedside.  Obese.  HEENT: Normocephalic, atraumatic; pupils are PERRL with intact EOM; oropharynx is pink and moist without exudate or erythema. No phonation changes difficult handling oral secretions or speaking full sentences.  Normal facial movements  Neck: Supple, full range of motion without pain;    Cardiovascular: Regular rate and rhythm, normal S1-S2.   Pulmonary: Respiratory effort regular nonlabored, breath sounds clear to auscultation all fields.   Abdomen: Soft, nontender nondistended with normoactive bowel sounds; no rebound or guarding. No pain over the epigastrium  Musculoskeletal: Independent range of motion of all extremities with no palpable tenderness or edema.   Neuro: Alert oriented x3, speech is clear and appropriate, GCS 15.  Intact coordination  Skin:  Fleshtone warm, dry, intact; no erythematous or petechial rash or lesion       Procedures           ED Course                                             MDM  Number of Diagnoses or Management Options  Esophageal dysphagia:   Diagnosis management comments: Medical decision  Comorbidities:  has a past medical history of A-fib (CMS/[facility]) (2017), CAD (coronary artery disease), History of echocardiogram (07/16/2019), HLD (hyperlipidemia), HTN  (hypertension), CABG (01/2017), and Hypothyroidism.  Differentials: ISIAH felt unlikely based on HPI and physical exam, esophageal stricture, cancer, hiatal hernia; not all inclusive of differentials considered  Discussion with provider: david    Patient was given a p.o. challenge and states he is able to keep the liquid down but feels like it does still get stuck there is no drooling.  I discussed with on-call GI who states he will evaluate patient    i discussed findings with patient and family who voices understanding of admission for possible EGD and gi evaluation.    Patient Progress  Patient progress: stable      Final diagnoses:   Esophageal dysphagia            Rachele Schrader, APRN  03/15/20 1052

## 2020-03-17 PROCEDURE — 20610 DRAIN/INJ JOINT/BURSA W/O US: CPT | Performed by: FAMILY MEDICINE

## 2020-03-17 RX ORDER — TRIAMCINOLONE ACETONIDE 40 MG/ML
80 INJECTION, SUSPENSION INTRA-ARTICULAR; INTRAMUSCULAR
Status: COMPLETED | OUTPATIENT
Start: 2020-03-17 | End: 2020-03-17

## 2020-03-17 RX ADMIN — TRIAMCINOLONE ACETONIDE 80 MG: 40 INJECTION, SUSPENSION INTRA-ARTICULAR; INTRAMUSCULAR at 12:48

## 2020-03-17 NOTE — PROGRESS NOTES
Procedure   Large Joint Arthrocentesis: L knee  Date/Time: 3/17/2020 12:48 PM  Consent given by: patient  Site marked: site marked  Timeout: Immediately prior to procedure a time out was called to verify the correct patient, procedure, equipment, support staff and site/side marked as required   Supporting Documentation  Indications: pain   Procedure Details  Location: knee - L knee  Preparation: Patient was prepped and draped in the usual sterile fashion  Needle size: 25 G  Approach: anteromedial  Medications administered: 80 mg triamcinolone acetonide 40 MG/ML (2cc of 1% lidocaine without epinepherine, and 2cc of 40mg Kenalog)  Patient tolerance: patient tolerated the procedure well with no immediate complications (Blood loss negligable, pt admits to immediate decrease in pain and improved ROM with gentle ambulation post injection.)

## 2020-03-31 ENCOUNTER — OFFICE VISIT (OUTPATIENT)
Dept: ORTHOPEDIC SURGERY | Facility: CLINIC | Age: 60
End: 2020-03-31

## 2020-03-31 VITALS
HEART RATE: 69 BPM | SYSTOLIC BLOOD PRESSURE: 144 MMHG | BODY MASS INDEX: 36.19 KG/M2 | HEIGHT: 68 IN | WEIGHT: 238.8 LBS | DIASTOLIC BLOOD PRESSURE: 88 MMHG

## 2020-03-31 DIAGNOSIS — M17.12 PRIMARY OSTEOARTHRITIS OF LEFT KNEE: Primary | ICD-10-CM

## 2020-03-31 PROCEDURE — 99213 OFFICE O/P EST LOW 20 MIN: CPT | Performed by: FAMILY MEDICINE

## 2020-03-31 NOTE — PROGRESS NOTES
Primary Care Sports Medicine Office Visit Note     Patient ID: Vinnie Cloud is a 59 y.o. male.    Chief Complaint:  Chief Complaint   Patient presents with   • Left Knee - Pain     HPI:    Mr. Vinnie Cloud is a 59 y.o. male who returns to the clinic today for left knee pain follow-up.  He states that previously after being seen on 3/17/2020 he had complete resolution of all of his symptoms status post corticosteroid injection.  Unfortunately this did not last him for very long at all.  Between 7 and 10 days.  About a week ago now his pain returned without any new injury, fall, or trauma.  Same pain, insidious onset.  Worse with activity, better with rest.    Past Medical History:   Diagnosis Date   • A-fib (CMS/HCC) 2017   • CAD (coronary artery disease)    • History of echocardiogram 07/16/2019    EF 56% LV systolic function is normal.    • HLD (hyperlipidemia)    • HTN (hypertension)    • Hx of CABG 01/2017   • Hypothyroidism        Past Surgical History:   Procedure Laterality Date   • CORONARY ARTERY BYPASS GRAFT  01/2017   • ENDOSCOPY N/A 3/15/2020    Procedure: ESOPHAGOGASTRODUODENOSCOPY WITH DILATATION (BALLOON TO 18);  Surgeon: Jai Castaneda MD;  Location: [facility] ENDOSCOPY;  Service: Gastroenterology;  Laterality: N/A;  NO FOREIGN BODY, FOOD IN STOMACH, DUODENITIS, ESOPHAGITIS, NO STRICTURE   • TRIGGER FINGER RELEASE      thumb       Family History   Problem Relation Age of Onset   • Cancer Mother    • Diabetes Mother    • Heart disease Mother    • Hypertension Mother    • No Known Problems Father      Social History     Occupational History   • Not on file   Tobacco Use   • Smoking status: Never Smoker   • Smokeless tobacco: Never Used   Substance and Sexual Activity   • Alcohol use: Yes     Alcohol/week: 3.0 standard drinks     Types: 3 Cans of beer per week     Comment: weeky   • Drug use: Defer   • Sexual activity: Defer      Review of Systems   Constitutional: Negative for  "activity change, fatigue and fever.   Musculoskeletal: Positive for arthralgias.   Skin: Negative for color change and rash.   Neurological: Negative for numbness.       Objective:    /88   Pulse 69   Ht 172.7 cm (68\")   Wt 108 kg (238 lb 12.8 oz)   BMI 36.31 kg/m²     Physical Examination:  Physical Exam   Constitutional: He appears well-developed and well-nourished. No distress.   HENT:   Head: Normocephalic and atraumatic.   Eyes: Conjunctivae are normal.   Cardiovascular: Intact distal pulses.   Pulmonary/Chest: Effort normal. No respiratory distress.   Musculoskeletal:        Left knee: He exhibits no effusion.   Neurological: He is alert.   Skin: Skin is warm. Capillary refill takes less than 2 seconds. He is not diaphoretic.   Nursing note and vitals reviewed.    Left Ankle Exam     Range of Motion   The patient has normal left ankle ROM.       Left Knee Exam     Muscle Strength   The patient has normal left knee strength.    Tenderness   The patient is experiencing tenderness in the lateral joint line, medial joint line and patella.    Range of Motion   Left knee extension: mildly decreased end ROM.   Left knee flexion: mildly decreased end ROM.     Tests   [place]:  Medial - negative Lateral - negative  Varus: negative Valgus: negative  Left knee patellar apprehension test: +patellar grind testing, +sunni kim testing.    Other   Erythema: absent  Sensation: normal  Pulse: present (distal to the knee, DP and PT palpable)  Swelling: none  Effusion: no effusion present          Imaging and other tests:  No new imaging today.    Assessment and Plan:    1. Primary osteoarthritis of left knee    I discussed with the patient his pathology and all treatment options today.  We discussed the fact that with complete resolution of his symptoms with corticosteroid injection, I do feel that his pain is mostly osteoarthritic.  I gave him the option of hyaluronic acid, which she elected to proceed with.  We " "will attempt to get PA, and have the patient come back in 7 to 10 days for injection only visit.    Ilia YANEZ \"Chance\" Gino MURPHY, , CAQSM  03/31/20  11:19    Disclaimer: Please note that areas of this note were completed with computer voice recognition software.  Quite often unanticipated grammatical, syntax, homophones, and other interpretive errors are inadvertently transcribed by the computer software. Please excuse any errors that have escaped final proofreading.  "

## 2020-04-06 ENCOUNTER — TELEPHONE (OUTPATIENT)
Dept: ORTHOPEDIC SURGERY | Facility: CLINIC | Age: 60
End: 2020-04-06

## 2020-04-06 NOTE — TELEPHONE ENCOUNTER
Manual Benefit Verification Request Successfully Sent  A manual benefit verification request has been submitted and is currently in progress. We will notify you when your response is ready for review - usually within one to two business days.

## 2020-04-07 NOTE — TELEPHONE ENCOUNTER
Received summary of benefits back today for patient. No PA required. Call reference # [id number].    Monovisc is covered. The plan type is Commercial PPO. There is no copay. The deductible and OOP have been met. The patient's responsibility will be 0%. Call reference number is [id number].    I tried to call back to get him on the schedule tomorrow with Dr. Gustafson but I had to leave a message.

## 2020-04-08 ENCOUNTER — OFFICE VISIT (OUTPATIENT)
Dept: ORTHOPEDIC SURGERY | Facility: CLINIC | Age: 60
End: 2020-04-08

## 2020-04-08 VITALS
DIASTOLIC BLOOD PRESSURE: 83 MMHG | WEIGHT: 238 LBS | TEMPERATURE: 98.2 F | BODY MASS INDEX: 36.07 KG/M2 | HEIGHT: 68 IN | HEART RATE: 87 BPM | SYSTOLIC BLOOD PRESSURE: 161 MMHG

## 2020-04-08 DIAGNOSIS — M17.12 PRIMARY OSTEOARTHRITIS OF LEFT KNEE: Primary | ICD-10-CM

## 2020-04-08 PROCEDURE — 20610 DRAIN/INJ JOINT/BURSA W/O US: CPT | Performed by: ORTHOPAEDIC SURGERY

## 2020-04-08 NOTE — PROGRESS NOTES
"     Patient ID: Vinnie Cloud is a 59 y.o. male.    Left knee pain  Patient is following up for Visco supplementation after being evaluated by my partner    Objective:    /83 (BP Location: Right arm, Patient Position: Sitting, Cuff Size: Large Adult)   Pulse 87   Temp 98.2 °F (36.8 °C) (Oral)   Ht 172.7 cm (68\")   Wt 108 kg (238 lb)   BMI 36.19 kg/m²     Physical Examination:  Left knee demonstrates a mild effusion no redness      Imaging:      Assessment:  Left knee degenerative joint disease    Plan:  Risks and benefits of the injection were discussed. Under sterile technique and written consent I injected one syringe of monovisc into the knee. It was well tolerated. Postinjection instructions were given  "

## 2020-10-05 ENCOUNTER — LAB (OUTPATIENT)
Dept: LAB | Facility: HOSPITAL | Age: 60
End: 2020-10-05

## 2020-10-05 ENCOUNTER — OFFICE VISIT (OUTPATIENT)
Dept: FAMILY MEDICINE CLINIC | Facility: CLINIC | Age: 60
End: 2020-10-05

## 2020-10-05 VITALS
OXYGEN SATURATION: 99 % | DIASTOLIC BLOOD PRESSURE: 90 MMHG | HEIGHT: 68 IN | WEIGHT: 244.3 LBS | HEART RATE: 71 BPM | BODY MASS INDEX: 37.02 KG/M2 | TEMPERATURE: 96.9 F | RESPIRATION RATE: 18 BRPM | SYSTOLIC BLOOD PRESSURE: 155 MMHG

## 2020-10-05 DIAGNOSIS — E03.9 ACQUIRED HYPOTHYROIDISM: ICD-10-CM

## 2020-10-05 DIAGNOSIS — R13.19 ESOPHAGEAL DYSPHAGIA: ICD-10-CM

## 2020-10-05 DIAGNOSIS — R53.83 OTHER FATIGUE: ICD-10-CM

## 2020-10-05 DIAGNOSIS — E11.9 TYPE 2 DIABETES MELLITUS WITHOUT COMPLICATION, WITHOUT LONG-TERM CURRENT USE OF INSULIN (HCC): ICD-10-CM

## 2020-10-05 DIAGNOSIS — I25.10 CORONARY ARTERY DISEASE INVOLVING NATIVE CORONARY ARTERY OF NATIVE HEART WITHOUT ANGINA PECTORIS: ICD-10-CM

## 2020-10-05 DIAGNOSIS — I10 ESSENTIAL HYPERTENSION: ICD-10-CM

## 2020-10-05 DIAGNOSIS — E78.5 HYPERLIPIDEMIA, UNSPECIFIED HYPERLIPIDEMIA TYPE: ICD-10-CM

## 2020-10-05 DIAGNOSIS — E11.9 TYPE 2 DIABETES MELLITUS WITHOUT COMPLICATION, WITHOUT LONG-TERM CURRENT USE OF INSULIN (HCC): Primary | ICD-10-CM

## 2020-10-05 LAB
25(OH)D3 SERPL-MCNC: 33.4 NG/ML (ref 30–100)
ANION GAP SERPL CALCULATED.3IONS-SCNC: 11.6 MMOL/L (ref 5–15)
BUN SERPL-MCNC: 18 MG/DL (ref 6–20)
BUN/CREAT SERPL: 20.5 (ref 7–25)
CALCIUM SPEC-SCNC: 9.3 MG/DL (ref 8.6–10.5)
CHLORIDE SERPL-SCNC: 101 MMOL/L (ref 98–107)
CO2 SERPL-SCNC: 24.4 MMOL/L (ref 22–29)
CREAT SERPL-MCNC: 0.88 MG/DL (ref 0.76–1.27)
GFR SERPL CREATININE-BSD FRML MDRD: 89 ML/MIN/1.73
GLUCOSE SERPL-MCNC: 311 MG/DL (ref 65–99)
HBA1C MFR BLD: 11.5 % (ref 3.5–5.6)
POTASSIUM SERPL-SCNC: 4.2 MMOL/L (ref 3.5–5.2)
SODIUM SERPL-SCNC: 137 MMOL/L (ref 136–145)

## 2020-10-05 PROCEDURE — 36415 COLL VENOUS BLD VENIPUNCTURE: CPT

## 2020-10-05 PROCEDURE — 83036 HEMOGLOBIN GLYCOSYLATED A1C: CPT

## 2020-10-05 PROCEDURE — 99214 OFFICE O/P EST MOD 30 MIN: CPT | Performed by: FAMILY MEDICINE

## 2020-10-05 PROCEDURE — 82306 VITAMIN D 25 HYDROXY: CPT

## 2020-10-05 PROCEDURE — 80048 BASIC METABOLIC PNL TOTAL CA: CPT

## 2020-10-05 PROCEDURE — 84402 ASSAY OF FREE TESTOSTERONE: CPT

## 2020-10-05 PROCEDURE — 84403 ASSAY OF TOTAL TESTOSTERONE: CPT

## 2020-10-05 NOTE — PATIENT INSTRUCTIONS
Continue your current medications and treatment.    Have the follow up labs done and call for results.    Follow up in the offcie in 6 months.    Get a colonoscopy.

## 2020-10-05 NOTE — PROGRESS NOTES
"Subjective   Vinnie Cloud is a 59 y.o. male.     Chief Complaint   Patient presents with   • Hypertension     6 month followup   • Hyperlipidemia   • Diabetes       HPI  Chief complaint: Coronary artery disease hyperlipidemia hypertension type 2 diabetes mellitus gastroesophageal reflux disease    The patient is a 59-year-old white male comes in for follow-up and maintenance of his current problems which include    1.  Hypertension-stable-patient is currently on lisinopril 5 mg daily metoprolol 25 mg twice a day.  He denied headache lightheadedness dizziness or chest pain.    2.  Hyperlipidemia-stable-patient on Lipitor 40 mg daily.  He denies myalgias and arthralgias.  Denies nausea or anorexia.    3.  Hypothyroidism-stable-patient on Synthroid 12 7 5 mg once a day.  He denied heat or cold intolerance tremor weight gain or weight loss.    4.  Coronary artery disease-stable-patient on aspirin and beta-blocker.  Denies chest pain shortness breath orthopnea or PND.    5.  Gastroesophageal reflux disease/esophageal stenosis-new-since I saw the patient last he had hospitalization for meat impaction.  Patient had upper GI endoscopy.  Patient is now on a proton pump inhibitor.  He denied symptoms.          The following portions of the patient's history were reviewed and updated as appropriate: allergies, current medications, past family history, past medical history, past social history, past surgical history and problem list.    Review of Systems    Objective     /90 (BP Location: Left arm, Patient Position: Sitting, Cuff Size: Large Adult)   Pulse 71   Temp 96.9 °F (36.1 °C) (Temporal)   Resp 18   Ht 172.7 cm (68\")   Wt 111 kg (244 lb 4.8 oz)   SpO2 99%   BMI 37.15 kg/m²     Physical Exam  Vitals signs and nursing note reviewed.   Constitutional:       Appearance: Normal appearance. He is well-developed and normal weight.   HENT:      Head: Normocephalic and atraumatic.      Nose: Nose normal.      " Mouth/Throat:      Mouth: Mucous membranes are moist.      Pharynx: Oropharynx is clear.   Eyes:      Extraocular Movements: Extraocular movements intact.      Conjunctiva/sclera: Conjunctivae normal.      Pupils: Pupils are equal, round, and reactive to light.   Neck:      Musculoskeletal: Normal range of motion and neck supple.   Cardiovascular:      Rate and Rhythm: Normal rate and regular rhythm.      Heart sounds: Normal heart sounds.   Pulmonary:      Effort: Pulmonary effort is normal.      Breath sounds: Normal breath sounds.   Abdominal:      General: Abdomen is flat. Bowel sounds are normal.      Palpations: Abdomen is soft.   Musculoskeletal: Normal range of motion.   Skin:     General: Skin is warm and dry.   Neurological:      Mental Status: He is alert and oriented to person, place, and time.   Psychiatric:         Behavior: Behavior normal.         Thought Content: Thought content normal.         Judgment: Judgment normal.           Assessment/Plan   Vinnie was seen today for hypertension, hyperlipidemia and diabetes.    Diagnoses and all orders for this visit:    Type 2 diabetes mellitus without complication, without long-term current use of insulin (CMS/[facility])  -     Hemoglobin A1c; Future  -     Basic Metabolic Panel; Future    Other fatigue  -     Vitamin D 25 Hydroxy; Future  -     Testosterone (Free & Total), LC / MS; Future      Patient Instructions   Continue your current medications and treatment.    Have the follow up labs done and call for results.    Follow up in the offcie in 6 months.    Get a colonoscopy.          Rashel Real Jr., MD    10/05/20

## 2020-10-07 RX ORDER — METFORMIN HYDROCHLORIDE 500 MG/1
1000 TABLET, EXTENDED RELEASE ORAL 2 TIMES DAILY
Qty: 90 TABLET | Refills: 3
Start: 2020-10-07 | End: 2020-10-09

## 2020-10-09 ENCOUNTER — TELEPHONE (OUTPATIENT)
Dept: FAMILY MEDICINE CLINIC | Facility: CLINIC | Age: 60
End: 2020-10-09

## 2020-10-09 LAB
TESTOST FREE SERPL-MCNC: 6.5 PG/ML (ref 7.2–24)
TESTOST SERPL-MCNC: 325.3 NG/DL (ref 264–916)

## 2020-10-09 RX ORDER — METFORMIN HYDROCHLORIDE 500 MG/1
500 TABLET, EXTENDED RELEASE ORAL DAILY
Qty: 90 TABLET | Refills: 3 | Status: SHIPPED
Start: 2020-10-09 | End: 2020-12-23

## 2020-10-09 RX ORDER — EMPAGLIFLOZIN 10 MG/1
1 TABLET, FILM COATED ORAL DAILY
Qty: 30 TABLET | Refills: 2 | Status: SHIPPED | OUTPATIENT
Start: 2020-10-09 | End: 2020-12-23 | Stop reason: SDUPTHER

## 2020-10-09 NOTE — TELEPHONE ENCOUNTER
I spoke with the patient/wife.  He cannot tolerate higher doses of Metformin than 500 mgm a day.  He is, therefore, going to take Metformin 500 mgm a day.  He is to start Jardiance 10 mgm a day.  Follow up in the office in 6 weeks.

## 2020-11-05 RX ORDER — METFORMIN HYDROCHLORIDE 500 MG/1
500 TABLET, EXTENDED RELEASE ORAL DAILY
Qty: 90 TABLET | Refills: 0 | Status: SHIPPED | OUTPATIENT
Start: 2020-11-05 | End: 2020-12-23 | Stop reason: SDUPTHER

## 2020-11-24 ENCOUNTER — TELEPHONE (OUTPATIENT)
Dept: FAMILY MEDICINE CLINIC | Facility: CLINIC | Age: 60
End: 2020-11-24

## 2020-11-24 NOTE — TELEPHONE ENCOUNTER
Tested positive for Covid about 10 days out. Needs something for Anxiety. He is to send results of COVID Test.

## 2020-11-24 NOTE — TELEPHONE ENCOUNTER
I spoke with the patient.  I recommended symptomatic treatment.  He was to go to the ER with increased shortness of breath, dizziness, lightheadedness.  I recommended not taking anxiety medications.

## 2020-12-23 ENCOUNTER — OFFICE VISIT (OUTPATIENT)
Dept: FAMILY MEDICINE CLINIC | Facility: CLINIC | Age: 60
End: 2020-12-23

## 2020-12-23 VITALS
DIASTOLIC BLOOD PRESSURE: 90 MMHG | OXYGEN SATURATION: 97 % | SYSTOLIC BLOOD PRESSURE: 144 MMHG | WEIGHT: 235 LBS | TEMPERATURE: 96.9 F | HEART RATE: 82 BPM | RESPIRATION RATE: 18 BRPM | HEIGHT: 68 IN | BODY MASS INDEX: 35.61 KG/M2

## 2020-12-23 DIAGNOSIS — I10 ESSENTIAL HYPERTENSION: Primary | ICD-10-CM

## 2020-12-23 DIAGNOSIS — I25.10 CORONARY ARTERY DISEASE INVOLVING NATIVE CORONARY ARTERY OF NATIVE HEART WITHOUT ANGINA PECTORIS: ICD-10-CM

## 2020-12-23 DIAGNOSIS — U07.1 COVID-19 VIRUS INFECTION: ICD-10-CM

## 2020-12-23 DIAGNOSIS — E11.9 TYPE 2 DIABETES MELLITUS WITHOUT COMPLICATION, WITHOUT LONG-TERM CURRENT USE OF INSULIN (HCC): ICD-10-CM

## 2020-12-23 DIAGNOSIS — E03.9 ACQUIRED HYPOTHYROIDISM: ICD-10-CM

## 2020-12-23 DIAGNOSIS — E78.5 HYPERLIPIDEMIA, UNSPECIFIED HYPERLIPIDEMIA TYPE: ICD-10-CM

## 2020-12-23 PROCEDURE — 99214 OFFICE O/P EST MOD 30 MIN: CPT | Performed by: FAMILY MEDICINE

## 2020-12-23 RX ORDER — METOPROLOL SUCCINATE 25 MG/1
25 TABLET, EXTENDED RELEASE ORAL 2 TIMES DAILY
Qty: 180 TABLET | Refills: 3 | Status: SHIPPED | OUTPATIENT
Start: 2020-12-23 | End: 2021-09-30

## 2020-12-23 RX ORDER — EMPAGLIFLOZIN 10 MG/1
1 TABLET, FILM COATED ORAL DAILY
Qty: 90 TABLET | Refills: 1 | Status: SHIPPED | OUTPATIENT
Start: 2020-12-23 | End: 2021-04-01

## 2020-12-23 RX ORDER — METFORMIN HYDROCHLORIDE 500 MG/1
500 TABLET, EXTENDED RELEASE ORAL DAILY
Qty: 90 TABLET | Refills: 1 | Status: SHIPPED | OUTPATIENT
Start: 2020-12-23 | End: 2021-03-29

## 2020-12-23 RX ORDER — ATORVASTATIN CALCIUM 40 MG/1
40 TABLET, FILM COATED ORAL DAILY
Qty: 90 TABLET | Refills: 3 | Status: SHIPPED | OUTPATIENT
Start: 2020-12-23 | End: 2021-12-31

## 2020-12-23 RX ORDER — LEVOTHYROXINE SODIUM 0.07 MG/1
75 TABLET ORAL DAILY
Qty: 90 TABLET | Refills: 3 | Status: SHIPPED | OUTPATIENT
Start: 2020-12-23 | End: 2021-12-14 | Stop reason: SDUPTHER

## 2020-12-23 RX ORDER — LISINOPRIL 5 MG/1
5 TABLET ORAL DAILY
Qty: 90 TABLET | Refills: 3 | Status: SHIPPED | OUTPATIENT
Start: 2020-12-23 | End: 2021-05-18

## 2020-12-23 RX ORDER — PANTOPRAZOLE SODIUM 40 MG/1
40 TABLET, DELAYED RELEASE ORAL DAILY
Qty: 90 TABLET | Refills: 3 | Status: SHIPPED | OUTPATIENT
Start: 2020-12-23 | End: 2021-10-07 | Stop reason: SDUPTHER

## 2020-12-23 NOTE — PROGRESS NOTES
Subjective   Vinnie Cloud is a 60 y.o. male.     Chief Complaint   Patient presents with   • Diabetes     6 week followup   • Hypertension   • Hyperlipidemia       HPI  Chief complaint: Hypertension hyperlipidemia type 2 diabetes mellitus coronary artery disease hypothyroidism Covid infection    Patient is a 60-year-old white male comes in for follow-up and maintenance of his current problems which include    1.  Hypertension-stable-patient is currently taking lisinopril 5 mg daily metoprolol 25 mg twice a day.  Denies headache lightheadedness dizziness or chest pain.    2.  Hyperlipidemia-stable-patient on Lipitor 40 mg daily.  Denies myalgias and arthralgias.    3.  Type 2 diabetes mellitus-unchanged-the patient is on Metformin 500 mg daily and Jardiance 10 mg daily.  He is asymptomatic.  Patient states is not been good about following his diet.  He had a family member die of pancreatic cancer.  He has been on the road quite a bit.  He requested an A1c be done in 3 months and not now.    4. coronary artery disease-stable-patient denied chest pain shortness of breath orthopnea or PND.  He is on aspirin 81 mg daily metoprolol.    5.  Hypothyroidism-stable-patient on Synthroid 12 7 5 mg daily.  Denied heat or cold intolerance tremor weight gain or weight loss.    6.  Covid infection-new-patient states about a month or 6 weeks ago he had developed Covid infection.  He had fatigue shortness of breath and cough.  He states he is doing significantly better.  He states he is essentially asymptomatic now.      The following portions of the patient's history were reviewed and updated as appropriate: allergies, current medications, past family history, past medical history, past social history, past surgical history and problem list.    Review of Systems    Objective     /90 (BP Location: Right arm, Patient Position: Sitting, Cuff Size: Large Adult)   Pulse 82   Temp 96.9 °F (36.1 °C) (Infrared)   Resp 18    "Ht 172.7 cm (68\")   Wt 107 kg (235 lb)   SpO2 97%   BMI 35.73 kg/m²     Physical Exam  Vitals signs and nursing note reviewed.   Constitutional:       Appearance: He is well-developed and normal weight.   HENT:      Head: Normocephalic and atraumatic.      Nose: Nose normal.      Mouth/Throat:      Mouth: Mucous membranes are moist.      Pharynx: Oropharynx is clear.   Eyes:      Extraocular Movements: Extraocular movements intact.      Conjunctiva/sclera: Conjunctivae normal.      Pupils: Pupils are equal, round, and reactive to light.   Neck:      Musculoskeletal: Normal range of motion and neck supple.   Cardiovascular:      Rate and Rhythm: Normal rate and regular rhythm.      Pulses: Normal pulses.      Heart sounds: Normal heart sounds.   Pulmonary:      Effort: Pulmonary effort is normal.      Breath sounds: Normal breath sounds.   Abdominal:      General: Abdomen is flat. Bowel sounds are normal.      Palpations: Abdomen is soft.   Musculoskeletal: Normal range of motion.   Skin:     General: Skin is warm and dry.   Neurological:      Mental Status: He is alert and oriented to person, place, and time.   Psychiatric:         Behavior: Behavior normal.         Thought Content: Thought content normal.         Judgment: Judgment normal.           Assessment/Plan   Diagnoses and all orders for this visit:    1. Essential hypertension (Primary)  -     lisinopril (PRINIVIL,ZESTRIL) 5 MG tablet; Take 1 tablet by mouth Daily.  Dispense: 90 tablet; Refill: 3    2. COVID-19 virus infection    3. Hyperlipidemia, unspecified hyperlipidemia type    4. Coronary artery disease involving native coronary artery of native heart without angina pectoris    5. Acquired hypothyroidism    6. Type 2 diabetes mellitus without complication, without long-term current use of insulin (CMS/[facility])    Other orders  -     Empagliflozin (Jardiance) 10 MG tablet; Take 10 mg by mouth Daily.  Dispense: 90 tablet; Refill: 1  -     metFORMIN ER " (GLUCOPHAGE-XR) 500 MG 24 hr tablet; Take 1 tablet by mouth Daily.  Dispense: 90 tablet; Refill: 1  -     atorvastatin (LIPITOR) 40 MG tablet; Take 1 tablet by mouth Daily.  Dispense: 90 tablet; Refill: 3  -     metoprolol succinate XL (TOPROL-XL) 25 MG 24 hr tablet; Take 1 tablet by mouth 2 (Two) Times a Day.  Dispense: 180 tablet; Refill: 3  -     levothyroxine (SYNTHROID, LEVOTHROID) 75 MCG tablet; Take 1 tablet by mouth Daily.  Dispense: 90 tablet; Refill: 3  -     pantoprazole (PROTONIX) 40 MG EC tablet; Take 1 tablet by mouth Daily.  Dispense: 90 tablet; Refill: 3      Patient Instructions   Continue your current medications and treatment.    Follow up in the office in 3 months.    Laboratory testing at that time.      Rashel Real Jr., MD    12/23/20

## 2021-01-02 ENCOUNTER — APPOINTMENT (OUTPATIENT)
Dept: CT IMAGING | Facility: HOSPITAL | Age: 61
End: 2021-01-02

## 2021-01-02 ENCOUNTER — HOSPITAL ENCOUNTER (EMERGENCY)
Facility: HOSPITAL | Age: 61
Discharge: HOME | End: 2021-01-02
Attending: EMERGENCY MEDICINE | Admitting: EMERGENCY MEDICINE

## 2021-01-02 VITALS
BODY MASS INDEX: 36.22 KG/M2 | WEIGHT: 239 LBS | TEMPERATURE: 97.9 F | OXYGEN SATURATION: 99 % | RESPIRATION RATE: 17 BRPM | SYSTOLIC BLOOD PRESSURE: 135 MMHG | HEIGHT: 68 IN | HEART RATE: 76 BPM | DIASTOLIC BLOOD PRESSURE: 65 MMHG

## 2021-01-02 DIAGNOSIS — S30.1XXA CONTUSION OF ABDOMINAL WALL, INITIAL ENCOUNTER: ICD-10-CM

## 2021-01-02 DIAGNOSIS — S20.219A CONTUSION OF CHEST WALL, UNSPECIFIED LATERALITY, INITIAL ENCOUNTER: ICD-10-CM
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
## 2021-01-02 DIAGNOSIS — N32.89 BLADDER MASS: ICD-10-CM

## 2021-01-02 LAB
ALBUMIN SERPL-MCNC: 4.3 G/DL (ref 3.5–5.2)
ALBUMIN/GLOB SERPL: 1.5 G/DL
ALP SERPL-CCNC: 116 U/L (ref 39–117)
ALT SERPL W P-5'-P-CCNC: 18 U/L (ref 1–41)
ANION GAP SERPL CALCULATED.3IONS-SCNC: 11 MMOL/L (ref 5–15)
AST SERPL-CCNC: 21 U/L (ref 1–40)
BASOPHILS # BLD AUTO: 0.1 10*3/MM3 (ref 0–0.2)
BASOPHILS NFR BLD AUTO: 1.1 % (ref 0–1.5)
BILIRUB SERPL-MCNC: 0.6 MG/DL (ref 0–1.2)
BILIRUB UR QL STRIP: NEGATIVE
BUN SERPL-MCNC: 23 MG/DL (ref 6–20)
BUN/CREAT SERPL: 25.8 (ref 7–25)
CALCIUM SPEC-SCNC: 9.4 MG/DL (ref 8.6–10.5)
CHLORIDE SERPL-SCNC: 100 MMOL/L (ref 98–107)
CLARITY UR: CLEAR
CO2 SERPL-SCNC: 24 MMOL/L (ref 22–29)
COLOR UR: YELLOW
CREAT SERPL-MCNC: 0.89 MG/DL (ref 0.76–1.27)
DEPRECATED RDW RBC AUTO: 43.8 FL (ref 37–54)
EOSINOPHIL # BLD AUTO: 0.2 10*3/MM3 (ref 0–0.4)
EOSINOPHIL NFR BLD AUTO: 2.1 % (ref 0.3–6.2)
ERYTHROCYTE [DISTWIDTH] IN BLOOD BY AUTOMATED COUNT: 14.6 % (ref 12.3–15.4)
GFR SERPL CREATININE-BSD FRML MDRD: 87 ML/MIN/1.73
GLOBULIN UR ELPH-MCNC: 2.9 GM/DL
GLUCOSE SERPL-MCNC: 258 MG/DL (ref 65–99)
GLUCOSE UR STRIP-MCNC: ABNORMAL MG/DL
HCT VFR BLD AUTO: 45.2 % (ref 37.5–51)
HGB BLD-MCNC: 14.9 G/DL (ref 13–17.7)
HGB UR QL STRIP.AUTO: NEGATIVE
HOLD SPECIMEN: NORMAL
KETONES UR QL STRIP: ABNORMAL
LEUKOCYTE ESTERASE UR QL STRIP.AUTO: NEGATIVE
LIPASE SERPL-CCNC: 37 U/L (ref 13–60)
LYMPHOCYTES # BLD AUTO: 2.4 10*3/MM3 (ref 0.7–3.1)
LYMPHOCYTES NFR BLD AUTO: 26.2 % (ref 19.6–45.3)
MCH RBC QN AUTO: 28.2 PG (ref 26.6–33)
MCHC RBC AUTO-ENTMCNC: 33 G/DL (ref 31.5–35.7)
MCV RBC AUTO: 85.4 FL (ref 79–97)
MONOCYTES # BLD AUTO: 0.7 10*3/MM3 (ref 0.1–0.9)
MONOCYTES NFR BLD AUTO: 8.1 % (ref 5–12)
NEUTROPHILS NFR BLD AUTO: 5.7 10*3/MM3 (ref 1.7–7)
NEUTROPHILS NFR BLD AUTO: 62.5 % (ref 42.7–76)
NITRITE UR QL STRIP: NEGATIVE
NRBC BLD AUTO-RTO: 0 /100 WBC (ref 0–0.2)
PH UR STRIP.AUTO: 5.5 [PH] (ref 5–8)
PLATELET # BLD AUTO: 264 10*3/MM3 (ref 140–450)
PMV BLD AUTO: 8.5 FL (ref 6–12)
POTASSIUM SERPL-SCNC: 4 MMOL/L (ref 3.5–5.2)
PROT SERPL-MCNC: 7.2 G/DL (ref 6–8.5)
PROT UR QL STRIP: NEGATIVE
RBC # BLD AUTO: 5.29 10*6/MM3 (ref 4.14–5.8)
SODIUM SERPL-SCNC: 135 MMOL/L (ref 136–145)
SP GR UR STRIP: 1.04 (ref 1–1.03)
UROBILINOGEN UR QL STRIP: ABNORMAL
WBC # BLD AUTO: 9.1 10*3/MM3 (ref 3.4–10.8)
WHOLE BLOOD HOLD SPECIMEN: NORMAL

## 2021-01-02 PROCEDURE — 0 IOPAMIDOL PER 1 ML: Performed by: EMERGENCY MEDICINE

## 2021-01-02 PROCEDURE — 80053 COMPREHEN METABOLIC PANEL: CPT | Performed by: EMERGENCY MEDICINE

## 2021-01-02 PROCEDURE — 71260 CT THORAX DX C+: CPT

## 2021-01-02 PROCEDURE — 85025 COMPLETE CBC W/AUTO DIFF WBC: CPT | Performed by: EMERGENCY MEDICINE

## 2021-01-02 PROCEDURE — 99283 EMERGENCY DEPT VISIT LOW MDM: CPT

## 2021-01-02 PROCEDURE — 74177 CT ABD & PELVIS W/CONTRAST: CPT

## 2021-01-02 PROCEDURE — 83690 ASSAY OF LIPASE: CPT | Performed by: EMERGENCY MEDICINE

## 2021-01-02 PROCEDURE — 81003 URINALYSIS AUTO W/O SCOPE: CPT | Performed by: EMERGENCY MEDICINE

## 2021-01-02 PROCEDURE — 72125 CT NECK SPINE W/O DYE: CPT

## 2021-01-02 RX ORDER — SODIUM CHLORIDE 0.9 % (FLUSH) 0.9 %
10 SYRINGE (ML) INJECTION AS NEEDED
Status: DISCONTINUED | OUTPATIENT
Start: 2021-01-02 | End: 2021-01-03 | Stop reason: HOSPADM

## 2021-01-02 RX ADMIN — IOPAMIDOL 100 ML: 755 INJECTION, SOLUTION INTRAVENOUS at 22:47

## 2021-01-02 RX ADMIN — SODIUM CHLORIDE 500 ML: 9 INJECTION, SOLUTION INTRAVENOUS at 21:21
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
## 2021-01-03 NOTE — DISCHARGE INSTRUCTIONS
Rest Tylenol ibuprofen for pain.  Return for shortness of breath coughing up blood severe abdominal pain bloody urine bloody stool dizziness passing out altered mental status or any other new or worse problems or concerns.  CT scan shows 2 cm smooth marginated anterior bladder mass.  This could represent a hematoma but also could represent a bladder tumor and stress importance of follow-up with Dr. Morillo or any urologist this coming week for cystoscopy and further evaluation to return for severe pain or bloody urine immediately

## 2021-01-04 ENCOUNTER — EPISODE CHANGES (OUTPATIENT)
Dept: CASE MANAGEMENT | Facility: OTHER | Age: 61
End: 2021-01-04

## 2021-02-19 ENCOUNTER — OFFICE VISIT (OUTPATIENT)
Dept: FAMILY MEDICINE CLINIC | Facility: CLINIC | Age: 61
End: 2021-02-19

## 2021-02-19 VITALS
WEIGHT: 240.4 LBS | DIASTOLIC BLOOD PRESSURE: 84 MMHG | SYSTOLIC BLOOD PRESSURE: 140 MMHG | OXYGEN SATURATION: 97 % | TEMPERATURE: 96.8 F | HEIGHT: 68 IN | RESPIRATION RATE: 16 BRPM | BODY MASS INDEX: 36.43 KG/M2 | HEART RATE: 70 BPM
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
## 2021-02-19 DIAGNOSIS — F43.10 POST-TRAUMATIC STRESS: Primary | Chronic | ICD-10-CM

## 2021-02-19 PROBLEM — U07.1 COVID-19 VIRUS INFECTION: Status: RESOLVED | Noted: 2020-12-23 | Resolved: 2021-02-19

## 2021-02-19 PROBLEM — E11.9 TYPE 2 DIABETES MELLITUS WITHOUT COMPLICATION, WITHOUT LONG-TERM CURRENT USE OF INSULIN: Chronic | Status: ACTIVE | Noted: 2020-02-07

## 2021-02-19 PROCEDURE — 99214 OFFICE O/P EST MOD 30 MIN: CPT | Performed by: FAMILY MEDICINE

## 2021-02-19 RX ORDER — LORAZEPAM 0.5 MG/1
0.5 TABLET ORAL 2 TIMES DAILY
Qty: 14 TABLET | Refills: 1 | Status: SHIPPED | OUTPATIENT
Start: 2021-02-19

## 2021-02-19 RX ORDER — CITALOPRAM 20 MG/1
20 TABLET ORAL DAILY
Qty: 30 TABLET | Refills: 2 | Status: SHIPPED | OUTPATIENT
Start: 2021-02-19 | End: 2021-03-14

## 2021-02-19 NOTE — PATIENT INSTRUCTIONS
Continue current medications and treatment for now.    Start taking citalopram 20 mg once a day.    Use Ativan 0.5 mg twice a day as needed.    Follow-up in the office in 4 to 6 weeks.
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
## 2021-03-14 RX ORDER — CITALOPRAM 20 MG/1
TABLET ORAL
Qty: 30 TABLET | Refills: 2 | Status: SHIPPED | OUTPATIENT
Start: 2021-03-14 | End: 2021-07-16 | Stop reason: SDUPTHER

## 2021-03-16 ENCOUNTER — TELEPHONE (OUTPATIENT)
Dept: FAMILY MEDICINE CLINIC | Facility: CLINIC | Age: 61
End: 2021-03-16

## 2021-03-16 NOTE — TELEPHONE ENCOUNTER
Pt's wife called to request a letter stating that Pt has PTSD and is not able to return until he sees Dr Real on 03/31  Call when ready, they will come to the office to  letter.    If there's any questions, they can be reached at [phone]

## 2021-03-29 RX ORDER — METFORMIN HYDROCHLORIDE 500 MG/1
TABLET, EXTENDED RELEASE ORAL
Qty: 90 TABLET | Refills: 0 | Status: SHIPPED | OUTPATIENT
Start: 2021-03-29 | End: 2021-09-03

## 2021-03-31 ENCOUNTER — OFFICE VISIT (OUTPATIENT)
Dept: FAMILY MEDICINE CLINIC | Facility: CLINIC | Age: 61
End: 2021-03-31

## 2021-03-31 ENCOUNTER — LAB (OUTPATIENT)
Dept: LAB | Facility: HOSPITAL | Age: 61
End: 2021-03-31

## 2021-03-31 VITALS
HEIGHT: 68 IN | SYSTOLIC BLOOD PRESSURE: 137 MMHG | HEART RATE: 72 BPM | BODY MASS INDEX: 35.8 KG/M2 | RESPIRATION RATE: 16 BRPM | TEMPERATURE: 96.9 F | DIASTOLIC BLOOD PRESSURE: 73 MMHG | OXYGEN SATURATION: 99 % | WEIGHT: 236.2 LBS

## 2021-03-31 DIAGNOSIS — E11.9 TYPE 2 DIABETES MELLITUS WITHOUT COMPLICATION, WITHOUT LONG-TERM CURRENT USE OF INSULIN (HCC): Chronic | ICD-10-CM

## 2021-03-31 DIAGNOSIS — E03.9 ACQUIRED HYPOTHYROIDISM: Chronic | ICD-10-CM

## 2021-03-31 DIAGNOSIS — I10 ESSENTIAL HYPERTENSION: Primary | Chronic | ICD-10-CM

## 2021-03-31 DIAGNOSIS — F43.10 POST-TRAUMATIC STRESS: Chronic | ICD-10-CM

## 2021-03-31 DIAGNOSIS — K21.9 GASTROESOPHAGEAL REFLUX DISEASE WITHOUT ESOPHAGITIS: ICD-10-CM

## 2021-03-31 DIAGNOSIS — I25.10 CORONARY ARTERY DISEASE INVOLVING NATIVE CORONARY ARTERY OF NATIVE HEART WITHOUT ANGINA PECTORIS: Chronic | ICD-10-CM

## 2021-03-31 DIAGNOSIS — E78.5 HYPERLIPIDEMIA, UNSPECIFIED HYPERLIPIDEMIA TYPE: Chronic | ICD-10-CM

## 2021-03-31 DIAGNOSIS — I10 ESSENTIAL HYPERTENSION: ICD-10-CM

## 2021-03-31 LAB
ALBUMIN SERPL-MCNC: 4.3 G/DL (ref 3.5–5.2)
ALBUMIN/GLOB SERPL: 1.8 G/DL
ALP SERPL-CCNC: 92 U/L (ref 39–117)
ALT SERPL W P-5'-P-CCNC: 15 U/L (ref 1–41)
ANION GAP SERPL CALCULATED.3IONS-SCNC: 10.7 MMOL/L (ref 5–15)
AST SERPL-CCNC: 16 U/L (ref 1–40)
BASOPHILS # BLD AUTO: 0.11 10*3/MM3 (ref 0–0.2)
BASOPHILS NFR BLD AUTO: 1.5 % (ref 0–1.5)
BILIRUB SERPL-MCNC: 0.5 MG/DL (ref 0–1.2)
BUN SERPL-MCNC: 22 MG/DL (ref 6–20)
BUN/CREAT SERPL: 22 (ref 7–25)
CALCIUM SPEC-SCNC: 9 MG/DL (ref 8.6–10.5)
CHLORIDE SERPL-SCNC: 103 MMOL/L (ref 98–107)
CHOLEST SERPL-MCNC: 156 MG/DL (ref 0–200)
CO2 SERPL-SCNC: 25.3 MMOL/L (ref 22–29)
CREAT SERPL-MCNC: 1 MG/DL (ref 0.76–1.27)
CREAT UR-MCNC: 45.4 MG/DL
DEPRECATED RDW RBC AUTO: 43.9 FL (ref 37–54)
EOSINOPHIL # BLD AUTO: 0.17 10*3/MM3 (ref 0–0.4)
EOSINOPHIL NFR BLD AUTO: 2.3 % (ref 0.3–6.2)
ERYTHROCYTE [DISTWIDTH] IN BLOOD BY AUTOMATED COUNT: 13.4 % (ref 12.3–15.4)
GFR SERPL CREATININE-BSD FRML MDRD: 76 ML/MIN/1.73
GLOBULIN UR ELPH-MCNC: 2.4 GM/DL
GLUCOSE SERPL-MCNC: 162 MG/DL (ref 65–99)
HBA1C MFR BLD: 8.9 % (ref 3.5–5.6)
HCT VFR BLD AUTO: 47.5 % (ref 37.5–51)
HDLC SERPL-MCNC: 42 MG/DL (ref 40–60)
HGB BLD-MCNC: 15.2 G/DL (ref 13–17.7)
IMM GRANULOCYTES # BLD AUTO: 0.01 10*3/MM3 (ref 0–0.05)
IMM GRANULOCYTES NFR BLD AUTO: 0.1 % (ref 0–0.5)
LDLC SERPL CALC-MCNC: 98 MG/DL (ref 0–100)
LDLC/HDLC SERPL: 2.32 {RATIO}
LYMPHOCYTES # BLD AUTO: 2.13 10*3/MM3 (ref 0.7–3.1)
LYMPHOCYTES NFR BLD AUTO: 28.7 % (ref 19.6–45.3)
MCH RBC QN AUTO: 28.5 PG (ref 26.6–33)
MCHC RBC AUTO-ENTMCNC: 32 G/DL (ref 31.5–35.7)
MCV RBC AUTO: 89 FL (ref 79–97)
MONOCYTES # BLD AUTO: 0.69 10*3/MM3 (ref 0.1–0.9)
MONOCYTES NFR BLD AUTO: 9.3 % (ref 5–12)
NEUTROPHILS NFR BLD AUTO: 4.3 10*3/MM3 (ref 1.7–7)
NEUTROPHILS NFR BLD AUTO: 58.1 % (ref 42.7–76)
NRBC BLD AUTO-RTO: 0 /100 WBC (ref 0–0.2)
PLATELET # BLD AUTO: 260 10*3/MM3 (ref 140–450)
PMV BLD AUTO: 11.5 FL (ref 6–12)
POTASSIUM SERPL-SCNC: 4.5 MMOL/L (ref 3.5–5.2)
PROT SERPL-MCNC: 6.7 G/DL (ref 6–8.5)
PROT UR-MCNC: 4 MG/DL
PROT/CREAT UR: 88.1 MG/G CREA (ref 0–200)
RBC # BLD AUTO: 5.34 10*6/MM3 (ref 4.14–5.8)
SODIUM SERPL-SCNC: 139 MMOL/L (ref 136–145)
TRIGL SERPL-MCNC: 83 MG/DL (ref 0–150)
TSH SERPL DL<=0.05 MIU/L-ACNC: 2.28 UIU/ML (ref 0.27–4.2)
VLDLC SERPL-MCNC: 16 MG/DL (ref 5–40)
WBC # BLD AUTO: 7.41 10*3/MM3 (ref 3.4–10.8)

## 2021-03-31 PROCEDURE — 80061 LIPID PANEL: CPT

## 2021-03-31 PROCEDURE — 80053 COMPREHEN METABOLIC PANEL: CPT

## 2021-03-31 PROCEDURE — 36415 COLL VENOUS BLD VENIPUNCTURE: CPT

## 2021-03-31 PROCEDURE — 83036 HEMOGLOBIN GLYCOSYLATED A1C: CPT

## 2021-03-31 PROCEDURE — 84443 ASSAY THYROID STIM HORMONE: CPT

## 2021-03-31 PROCEDURE — 84156 ASSAY OF PROTEIN URINE: CPT

## 2021-03-31 PROCEDURE — 99214 OFFICE O/P EST MOD 30 MIN: CPT | Performed by: FAMILY MEDICINE

## 2021-03-31 PROCEDURE — 85025 COMPLETE CBC W/AUTO DIFF WBC: CPT

## 2021-03-31 PROCEDURE — 82570 ASSAY OF URINE CREATININE: CPT
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
## 2021-03-31 NOTE — PATIENT INSTRUCTIONS
Continue your current medications and treatment.    Have the follow up labs done and call for results.    Get a colonoscopy.    Follow up in the office in 3 months.

## 2021-04-01 ENCOUNTER — DOCUMENTATION (OUTPATIENT)
Dept: FAMILY MEDICINE CLINIC | Facility: CLINIC | Age: 61
End: 2021-04-01

## 2021-04-01 ENCOUNTER — TELEPHONE (OUTPATIENT)
Dept: FAMILY MEDICINE CLINIC | Facility: CLINIC | Age: 61
End: 2021-04-01

## 2021-04-01 RX ORDER — EMPAGLIFLOZIN 25 MG/1
1 TABLET, FILM COATED ORAL DAILY
Qty: 90 TABLET | Refills: 1 | Status: SHIPPED | OUTPATIENT
Start: 2021-04-01 | End: 2021-10-07 | Stop reason: SDUPTHER

## 2021-04-01 NOTE — PROGRESS NOTES
I spoke with the patient today.  His A1c is improved but not to goal.  I recommended increasing his Jardiance to 25 mg a day.  He agreed to do so.

## 2021-04-01 NOTE — TELEPHONE ENCOUNTER
Elijah Villavicencio,  Please wrote a note extending his leave from work through 05/31/2021.  Thanks, JUAN A

## 2021-04-01 NOTE — TELEPHONE ENCOUNTER
Wrote letter excusing patient out of work until June 1, 2021. Spoke with patient and he will be by either today 04/01/2021 or tomorrow 04/02/2021.

## 2021-04-19 ENCOUNTER — OFFICE VISIT (OUTPATIENT)
Dept: CARDIOLOGY | Facility: CLINIC | Age: 61
End: 2021-04-19

## 2021-04-19 VITALS
HEART RATE: 71 BPM | WEIGHT: 236 LBS | SYSTOLIC BLOOD PRESSURE: 132 MMHG | RESPIRATION RATE: 18 BRPM | HEIGHT: 68 IN | BODY MASS INDEX: 35.77 KG/M2 | DIASTOLIC BLOOD PRESSURE: 80 MMHG

## 2021-04-19 DIAGNOSIS — R07.89 CHEST PAIN, ATYPICAL: Primary | ICD-10-CM

## 2021-04-19 PROCEDURE — 99214 OFFICE O/P EST MOD 30 MIN: CPT | Performed by: INTERNAL MEDICINE
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
## 2021-04-27 PROCEDURE — 93000 ELECTROCARDIOGRAM COMPLETE: CPT | Performed by: INTERNAL MEDICINE

## 2021-05-12 ENCOUNTER — APPOINTMENT (OUTPATIENT)
Dept: NUCLEAR MEDICINE | Facility: HOSPITAL | Age: 61
End: 2021-05-12

## 2021-05-12 ENCOUNTER — APPOINTMENT (OUTPATIENT)
Dept: CARDIOLOGY | Facility: HOSPITAL | Age: 61
End: 2021-05-12

## 2021-05-18 ENCOUNTER — OFFICE VISIT (OUTPATIENT)
Dept: FAMILY MEDICINE CLINIC | Facility: CLINIC | Age: 61
End: 2021-05-18

## 2021-05-18 VITALS
BODY MASS INDEX: 35.46 KG/M2 | WEIGHT: 234 LBS | TEMPERATURE: 96.8 F | OXYGEN SATURATION: 99 % | DIASTOLIC BLOOD PRESSURE: 85 MMHG | SYSTOLIC BLOOD PRESSURE: 143 MMHG | HEART RATE: 56 BPM | HEIGHT: 68 IN

## 2021-05-18 DIAGNOSIS — E03.9 ACQUIRED HYPOTHYROIDISM: Chronic | ICD-10-CM

## 2021-05-18 DIAGNOSIS — K21.9 GASTROESOPHAGEAL REFLUX DISEASE WITHOUT ESOPHAGITIS: ICD-10-CM

## 2021-05-18 DIAGNOSIS — E11.9 TYPE 2 DIABETES MELLITUS WITHOUT COMPLICATION, WITHOUT LONG-TERM CURRENT USE OF INSULIN (HCC): Chronic | ICD-10-CM

## 2021-05-18 DIAGNOSIS — I25.10 CORONARY ARTERY DISEASE INVOLVING NATIVE CORONARY ARTERY OF NATIVE HEART WITHOUT ANGINA PECTORIS: Chronic | ICD-10-CM

## 2021-05-18 DIAGNOSIS — E78.5 HYPERLIPIDEMIA, UNSPECIFIED HYPERLIPIDEMIA TYPE: Chronic | ICD-10-CM

## 2021-05-18 DIAGNOSIS — F43.10 POST-TRAUMATIC STRESS: Chronic | ICD-10-CM

## 2021-05-18 DIAGNOSIS — I10 ESSENTIAL HYPERTENSION: Primary | Chronic | ICD-10-CM

## 2021-05-18 PROCEDURE — 99214 OFFICE O/P EST MOD 30 MIN: CPT | Performed by: FAMILY MEDICINE
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
## 2021-05-19 ENCOUNTER — TELEPHONE (OUTPATIENT)
Dept: FAMILY MEDICINE CLINIC | Facility: CLINIC | Age: 61
End: 2021-05-19
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
## 2021-07-09 ENCOUNTER — APPOINTMENT (OUTPATIENT)
Dept: NUCLEAR MEDICINE | Facility: HOSPITAL | Age: 61
End: 2021-07-09

## 2021-07-09 ENCOUNTER — APPOINTMENT (OUTPATIENT)
Dept: CARDIOLOGY | Facility: HOSPITAL | Age: 61
End: 2021-07-09

## 2021-07-16 RX ORDER — CITALOPRAM 20 MG/1
20 TABLET ORAL DAILY
Qty: 30 TABLET | Refills: 2 | Status: SHIPPED | OUTPATIENT
Start: 2021-07-16 | End: 2021-10-07 | Stop reason: SDUPTHER

## 2021-07-16 NOTE — TELEPHONE ENCOUNTER
Caller: ALIYA MARTINEZ    Relationship: Emergency Contact    Best call back number: [phone]    Medication needed:   Requested Prescriptions     Pending Prescriptions Disp Refills   • citalopram (CeleXA) 20 MG tablet 30 tablet 2     Sig: Take 1 tablet by mouth Daily.       When do you need the refill by: ASAP    Does the patient have less than a 3 day supply:  [x] Yes  [] No    What is the patient's preferred pharmacy: [facility]/PHARMACY #[id number] - [place] - [address]  - [phone]  - [phone] FX

## 2021-09-03 RX ORDER — METFORMIN HYDROCHLORIDE 500 MG/1
TABLET, EXTENDED RELEASE ORAL
Qty: 90 TABLET | Refills: 0 | Status: SHIPPED | OUTPATIENT
Start: 2021-09-03 | End: 2022-03-15 | Stop reason: SDUPTHER

## 2021-09-30 ENCOUNTER — LAB (OUTPATIENT)
Dept: LAB | Facility: HOSPITAL | Age: 61
End: 2021-09-30

## 2021-09-30 ENCOUNTER — OFFICE VISIT (OUTPATIENT)
Dept: FAMILY MEDICINE CLINIC | Facility: CLINIC | Age: 61
End: 2021-09-30

## 2021-09-30 VITALS
WEIGHT: 233 LBS | HEIGHT: 68 IN | HEART RATE: 75 BPM | OXYGEN SATURATION: 95 % | BODY MASS INDEX: 35.31 KG/M2 | SYSTOLIC BLOOD PRESSURE: 138 MMHG | RESPIRATION RATE: 18 BRPM | DIASTOLIC BLOOD PRESSURE: 84 MMHG | TEMPERATURE: 96.8 F

## 2021-09-30 DIAGNOSIS — I25.10 CORONARY ARTERY DISEASE INVOLVING NATIVE CORONARY ARTERY OF NATIVE HEART WITHOUT ANGINA PECTORIS: Chronic | ICD-10-CM

## 2021-09-30 DIAGNOSIS — I10 ESSENTIAL HYPERTENSION: ICD-10-CM

## 2021-09-30 DIAGNOSIS — K21.9 GASTROESOPHAGEAL REFLUX DISEASE WITHOUT ESOPHAGITIS: ICD-10-CM

## 2021-09-30 DIAGNOSIS — F43.10 POST-TRAUMATIC STRESS: Chronic | ICD-10-CM

## 2021-09-30 DIAGNOSIS — E11.9 TYPE 2 DIABETES MELLITUS WITHOUT COMPLICATION, WITHOUT LONG-TERM CURRENT USE OF INSULIN (HCC): Chronic | ICD-10-CM

## 2021-09-30 DIAGNOSIS — E03.9 ACQUIRED HYPOTHYROIDISM: Chronic | ICD-10-CM

## 2021-09-30 DIAGNOSIS — E78.5 HYPERLIPIDEMIA, UNSPECIFIED HYPERLIPIDEMIA TYPE: Chronic | ICD-10-CM

## 2021-09-30 DIAGNOSIS — I10 ESSENTIAL HYPERTENSION: Primary | Chronic | ICD-10-CM

## 2021-09-30 LAB
ALBUMIN SERPL-MCNC: 4.7 G/DL (ref 3.5–5.2)
ALBUMIN/GLOB SERPL: 2 G/DL
ALP SERPL-CCNC: 100 U/L (ref 39–117)
ALT SERPL W P-5'-P-CCNC: 18 U/L (ref 1–41)
ANION GAP SERPL CALCULATED.3IONS-SCNC: 11.4 MMOL/L (ref 5–15)
AST SERPL-CCNC: 21 U/L (ref 1–40)
BASOPHILS # BLD AUTO: 0.08 10*3/MM3 (ref 0–0.2)
BASOPHILS NFR BLD AUTO: 1.2 % (ref 0–1.5)
BILIRUB SERPL-MCNC: 0.6 MG/DL (ref 0–1.2)
BUN SERPL-MCNC: 23 MG/DL (ref 6–20)
BUN/CREAT SERPL: 29.5 (ref 7–25)
CALCIUM SPEC-SCNC: 9.6 MG/DL (ref 8.6–10.5)
CHLORIDE SERPL-SCNC: 103 MMOL/L (ref 98–107)
CO2 SERPL-SCNC: 22.6 MMOL/L (ref 22–29)
CREAT SERPL-MCNC: 0.78 MG/DL (ref 0.76–1.27)
DEPRECATED RDW RBC AUTO: 42.6 FL (ref 37–54)
EOSINOPHIL # BLD AUTO: 0.14 10*3/MM3 (ref 0–0.4)
EOSINOPHIL NFR BLD AUTO: 2.1 % (ref 0.3–6.2)
ERYTHROCYTE [DISTWIDTH] IN BLOOD BY AUTOMATED COUNT: 13.2 % (ref 12.3–15.4)
GFR SERPL CREATININE-BSD FRML MDRD: 102 ML/MIN/1.73
GLOBULIN UR ELPH-MCNC: 2.3 GM/DL
GLUCOSE SERPL-MCNC: 149 MG/DL (ref 65–99)
HBA1C MFR BLD: 8.3 % (ref 3.5–5.6)
HCT VFR BLD AUTO: 47.9 % (ref 37.5–51)
HGB BLD-MCNC: 15.3 G/DL (ref 13–17.7)
IMM GRANULOCYTES # BLD AUTO: 0.02 10*3/MM3 (ref 0–0.05)
IMM GRANULOCYTES NFR BLD AUTO: 0.3 % (ref 0–0.5)
LYMPHOCYTES # BLD AUTO: 1.56 10*3/MM3 (ref 0.7–3.1)
LYMPHOCYTES NFR BLD AUTO: 23.9 % (ref 19.6–45.3)
MCH RBC QN AUTO: 28 PG (ref 26.6–33)
MCHC RBC AUTO-ENTMCNC: 31.9 G/DL (ref 31.5–35.7)
MCV RBC AUTO: 87.7 FL (ref 79–97)
MONOCYTES # BLD AUTO: 0.69 10*3/MM3 (ref 0.1–0.9)
MONOCYTES NFR BLD AUTO: 10.6 % (ref 5–12)
NEUTROPHILS NFR BLD AUTO: 4.05 10*3/MM3 (ref 1.7–7)
NEUTROPHILS NFR BLD AUTO: 61.9 % (ref 42.7–76)
NRBC BLD AUTO-RTO: 0 /100 WBC (ref 0–0.2)
PLATELET # BLD AUTO: 247 10*3/MM3 (ref 140–450)
PMV BLD AUTO: 11.3 FL (ref 6–12)
POTASSIUM SERPL-SCNC: 4.4 MMOL/L (ref 3.5–5.2)
PROT SERPL-MCNC: 7 G/DL (ref 6–8.5)
RBC # BLD AUTO: 5.46 10*6/MM3 (ref 4.14–5.8)
SODIUM SERPL-SCNC: 137 MMOL/L (ref 136–145)
WBC # BLD AUTO: 6.54 10*3/MM3 (ref 3.4–10.8)

## 2021-09-30 PROCEDURE — 85025 COMPLETE CBC W/AUTO DIFF WBC: CPT

## 2021-09-30 PROCEDURE — 36415 COLL VENOUS BLD VENIPUNCTURE: CPT

## 2021-09-30 PROCEDURE — 99214 OFFICE O/P EST MOD 30 MIN: CPT | Performed by: FAMILY MEDICINE

## 2021-09-30 PROCEDURE — 83036 HEMOGLOBIN GLYCOSYLATED A1C: CPT

## 2021-09-30 PROCEDURE — 80053 COMPREHEN METABOLIC PANEL: CPT

## 2021-09-30 NOTE — PROGRESS NOTES
"Nnao Cloud is a 60 y.o. male.     Chief Complaint   Patient presents with   • Coronary Artery Disease     6 month followup   • Hypertension   • Hyperlipidemia       HPI  Chief complaint: Hypertension hyperlipidemia coronary artery disease type 2 diabetes mellitus hypothyroidism posttraumatic stress    Patient is a 60-year-old white male comes in for follow-up of maintenance of his current problems which include    1.  Hypertension-stable-patient is currently taking no medication.  Patient stopped taking his blood pressure medicine because his heart rate in blood pressure was getting too low.  Pressure is normal off of medication.    2.  Hyperlipidemia-stable-patient on atorvastatin 40 mg daily.  He denies myalgias and arthralgias.  Denies nausea or anorexia.    3.  Type 2 diabetes mellitus-stable-patient is currently on Jardiance 25 mg daily and Metformin 500 mg daily.  He denied odynophagia or polyuria.     4. posttraumatic stress disorder-stable- the patient is on citalopram 20 mg daily and Ativan 0.5 mg as needed.  He states that this is improved.  He states he is able to drive.    5.  Hypothyroidism-stable-patient on Synthroid 0.075 mg daily.  Denied heat or cold intolerance tremor weight gain or weight loss.    6.  Gastroesophageal reflux disease-stable out of is on Protonix 40 mg daily.  Denies dysphagia or heartburn.           The following portions of the patient's history were reviewed and updated as appropriate: allergies, current medications, past family history, past medical history, past social history, past surgical history and problem list.    Review of Systems    Objective     /84 (BP Location: Left arm, Patient Position: Sitting, Cuff Size: Adult)   Pulse 75   Temp 96.8 °F (36 °C) (Infrared)   Resp 18   Ht 172.7 cm (68\")   Wt 106 kg (233 lb)   SpO2 95%   BMI 35.43 kg/m²     Physical Exam  Vitals and nursing note reviewed.   Constitutional:       Appearance: He is " well-developed and normal weight.   HENT:      Head: Normocephalic and atraumatic.      Nose: Nose normal.      Mouth/Throat:      Mouth: Mucous membranes are moist.      Pharynx: Oropharynx is clear.   Eyes:      Extraocular Movements: Extraocular movements intact.      Conjunctiva/sclera: Conjunctivae normal.      Pupils: Pupils are equal, round, and reactive to light.   Cardiovascular:      Rate and Rhythm: Normal rate and regular rhythm.      Pulses: Normal pulses.      Heart sounds: Normal heart sounds.   Pulmonary:      Effort: Pulmonary effort is normal.      Breath sounds: Normal breath sounds.   Abdominal:      General: Abdomen is flat. Bowel sounds are normal.      Palpations: Abdomen is soft.   Musculoskeletal:         General: Normal range of motion.      Cervical back: Neck supple.   Skin:     General: Skin is warm and dry.   Neurological:      Mental Status: He is alert and oriented to person, place, and time.   Psychiatric:         Behavior: Behavior normal.         Thought Content: Thought content normal.         Judgment: Judgment normal.           Assessment/Plan   Diagnoses and all orders for this visit:    1. Essential hypertension (Primary)  -     CBC & Differential; Future  -     Comprehensive Metabolic Panel; Future    2. Hyperlipidemia, unspecified hyperlipidemia type    3. Coronary artery disease involving native coronary artery of native heart without angina pectoris    4. Type 2 diabetes mellitus without complication, without long-term current use of insulin (CMS/[facility])  -     Hemoglobin A1c; Future    5. Acquired hypothyroidism    6. Gastroesophageal reflux disease without esophagitis    7. Post-traumatic stress      Patient Instructions   Continue your current medications and treatment.    Have the follow up labs done and call for results.    Follow up in the office in 3-6 months.      Rashel Real Jr., MD    09/30/21

## 2021-09-30 NOTE — PATIENT INSTRUCTIONS
Continue your current medications and treatment.    Have the follow up labs done and call for results.    Follow up in the office in 3-6 months.

## 2021-10-07 ENCOUNTER — TELEPHONE (OUTPATIENT)
Dept: FAMILY MEDICINE CLINIC | Facility: CLINIC | Age: 61
End: 2021-10-07

## 2021-10-07 RX ORDER — PANTOPRAZOLE SODIUM 40 MG/1
40 TABLET, DELAYED RELEASE ORAL DAILY
Qty: 90 TABLET | Refills: 3 | Status: SHIPPED | OUTPATIENT
Start: 2021-10-07 | End: 2022-10-07

## 2021-10-07 RX ORDER — CITALOPRAM 20 MG/1
20 TABLET ORAL DAILY
Qty: 90 TABLET | Refills: 1 | Status: SHIPPED | OUTPATIENT
Start: 2021-10-07 | End: 2021-10-13

## 2021-10-07 NOTE — TELEPHONE ENCOUNTER
Caller: ALIYA MARTINEZ    Relationship: Emergency Contact      Medication requested (name and dosage):    pantoprazole (PROTONIX) 40 MG EC tablet      Empagliflozin (Jardiance) 25 MG tablet      citalopram (CeleXA) 20 MG tablet     Pharmacy where request should be sent:   [place], [place] - [address] AT [facility] - [phone]  - [phone]   [phone]    Additional details provided by patient:  PATIENT IS COMPLETELY OUT OF THE ABOVE MEDICATIONS.    PATIENT'S WIFE IS CALLING TO REQUEST A NEW 90 DAY SUPPLY OF THE ABOVE MEDICATIONS.  PATIENT HAS HAD A CHANGE IN PHARMACY.     BEST call back number: [phone]    Does the patient have less than a 3 day supply:  [x] Yes  [] No    Alva Lora, Wiled Rep   10/07/21 11:21 EDT     PLEASE ADVISE.

## 2021-10-13 RX ORDER — CITALOPRAM 20 MG/1
TABLET ORAL
Qty: 90 TABLET | Refills: 1 | Status: SHIPPED | OUTPATIENT
Start: 2021-10-13 | End: 2022-04-18

## 2021-12-14 RX ORDER — LEVOTHYROXINE SODIUM 0.07 MG/1
75 TABLET ORAL DAILY
Qty: 90 TABLET | Refills: 3 | Status: SHIPPED | OUTPATIENT
Start: 2021-12-14 | End: 2023-01-13

## 2021-12-14 NOTE — TELEPHONE ENCOUNTER
Caller: ALIYA MARTINEZ    Relationship: Emergency Contact    Best call back number:[phone]    Requested Prescriptions:   Requested Prescriptions     Pending Prescriptions Disp Refills   • levothyroxine (SYNTHROID, LEVOTHROID) 75 MCG tablet 90 tablet 3     Sig: Take 1 tablet by mouth Daily.        Pharmacy where request should be sent: [place] [address] AT [facility] AND [facility] - [phone] [facility] [phone] FX     Additional details provided by patient: OUT OF MEDICATION   Does the patient have less than a 3 day supply:  [x] Yes  [] No    Mamta Lynn, RegAlok Rep   12/14/21 11:19 EST

## 2021-12-30 ENCOUNTER — OFFICE VISIT (OUTPATIENT)
Dept: FAMILY MEDICINE CLINIC | Facility: CLINIC | Age: 61
End: 2021-12-30

## 2021-12-30 VITALS
TEMPERATURE: 96.8 F | WEIGHT: 235 LBS | BODY MASS INDEX: 35.61 KG/M2 | HEART RATE: 64 BPM | SYSTOLIC BLOOD PRESSURE: 128 MMHG | HEIGHT: 68 IN | OXYGEN SATURATION: 99 % | RESPIRATION RATE: 16 BRPM | DIASTOLIC BLOOD PRESSURE: 84 MMHG

## 2021-12-30 DIAGNOSIS — G56.22 LESION OF LEFT ULNAR NERVE: Primary | ICD-10-CM

## 2021-12-30 PROCEDURE — 99213 OFFICE O/P EST LOW 20 MIN: CPT | Performed by: FAMILY MEDICINE

## 2021-12-30 NOTE — PATIENT INSTRUCTIONS
Continue your current medications.    Avoid pressure on the Ulnar nerve.    Followup in the office if no improvement.

## 2021-12-30 NOTE — PROGRESS NOTES
"Nano Cloud is a 61 y.o. male.     Chief Complaint   Patient presents with   • Numbness     tingling in left hand started a bout a month ago       HPI  Chief complaint: Numbness of the left little finger    Patient is a 61-year-old white male states he has had some numbness in the left little finger over the past several days.  Patient states that he does been time sitting in a chair resting his elbow on a table watching TV.  He denied weakness in the left upper extremity.  He denied headache.  He denied other neurological symptoms.        The following portions of the patient's history were reviewed and updated as appropriate: allergies, current medications, past family history, past medical history, past social history, past surgical history and problem list.    Review of Systems    Objective     /84 (BP Location: Right arm, Patient Position: Sitting, Cuff Size: Large Adult)   Pulse 64   Temp 96.8 °F (36 °C) (Infrared)   Resp 16   Ht 172.7 cm (68\")   Wt 107 kg (235 lb)   SpO2 99%   BMI 35.73 kg/m²     Physical Exam  Vitals and nursing note reviewed.   Constitutional:       Appearance: He is well-developed.   HENT:      Head: Normocephalic and atraumatic.   Eyes:      Pupils: Pupils are equal, round, and reactive to light.   Cardiovascular:      Rate and Rhythm: Normal rate and regular rhythm.      Pulses: Normal pulses.      Heart sounds: Normal heart sounds. No murmur heard.  No gallop.    Pulmonary:      Effort: Pulmonary effort is normal.      Breath sounds: Normal breath sounds.   Abdominal:      General: Bowel sounds are normal.      Palpations: Abdomen is soft.   Musculoskeletal:         General: Normal range of motion.      Cervical back: Neck supple.   Skin:     General: Skin is warm and dry.   Neurological:      Mental Status: He is oriented to person, place, and time.   Psychiatric:         Behavior: Behavior normal.         Thought Content: Thought content normal.        "  Judgment: Judgment normal.           Assessment/Plan   Diagnoses and all orders for this visit:    1. Lesion of left ulnar nerve (Primary)-patient was advised that he likely has left ulnar nerve dysfunction secondary to too much pressure on the ulnar nerve.  I recommended being mindful not to rest his elbow on a table or armrest for extended period of time.  Patient was advised that this does not improve his symptoms neck step would be to consider doing nerve conduction study and other evaluation clean x-ray of the neck.      Patient Instructions   Continue your current medications.    Avoid pressure on the Ulnar nerve.    Followup in the office if no improvement.      Rashel Real Jr., MD    12/30/21

## 2021-12-31 RX ORDER — ATORVASTATIN CALCIUM 40 MG/1
TABLET, FILM COATED ORAL
Qty: 90 TABLET | Refills: 1 | Status: SHIPPED | OUTPATIENT
Start: 2021-12-31 | End: 2022-04-18 | Stop reason: SDUPTHER

## 2022-03-15 NOTE — TELEPHONE ENCOUNTER
Caller: ALIYA MARTINEZ    Relationship: Emergency Contact    Best call back number: [phone]     Requested Prescriptions:   Requested Prescriptions     Pending Prescriptions Disp Refills   • metFORMIN ER (GLUCOPHAGE-XR) 500 MG 24 hr tablet 90 tablet 0     Sig: Take 1 tablet by mouth Daily.        Pharmacy where request should be sent:    [facility] [address] AT [facility] AND [facility] - [phone]  - [phone]   [phone]      Does the patient have less than a 3 day supply:  [x] Yes  [] No    Tanya Santiago Rep   03/15/22 13:41 EDT

## 2022-03-16 RX ORDER — METFORMIN HYDROCHLORIDE 500 MG/1
500 TABLET, EXTENDED RELEASE ORAL DAILY
Qty: 90 TABLET | Refills: 0 | Status: SHIPPED | OUTPATIENT
Start: 2022-03-16 | End: 2022-06-11

## 2022-03-16 RX ORDER — EMPAGLIFLOZIN 25 MG/1
TABLET, FILM COATED ORAL
Qty: 90 TABLET | Refills: 1 | Status: SHIPPED | OUTPATIENT
Start: 2022-03-16 | End: 2022-03-30

## 2022-03-16 RX ORDER — EMPAGLIFLOZIN 25 MG/1
TABLET, FILM COATED ORAL
Qty: 90 TABLET | Refills: 1 | Status: SHIPPED | OUTPATIENT
Start: 2022-03-16 | End: 2022-12-08

## 2022-03-30 ENCOUNTER — OFFICE VISIT (OUTPATIENT)
Dept: FAMILY MEDICINE CLINIC | Facility: CLINIC | Age: 62
End: 2022-03-30

## 2022-03-30 VITALS
SYSTOLIC BLOOD PRESSURE: 122 MMHG | DIASTOLIC BLOOD PRESSURE: 83 MMHG | TEMPERATURE: 96.8 F | RESPIRATION RATE: 16 BRPM | WEIGHT: 233 LBS | HEIGHT: 68 IN | HEART RATE: 72 BPM | OXYGEN SATURATION: 99 % | BODY MASS INDEX: 35.31 KG/M2

## 2022-03-30 DIAGNOSIS — I25.10 CORONARY ARTERY DISEASE INVOLVING NATIVE CORONARY ARTERY OF NATIVE HEART WITHOUT ANGINA PECTORIS: Chronic | ICD-10-CM

## 2022-03-30 DIAGNOSIS — E11.9 TYPE 2 DIABETES MELLITUS WITHOUT COMPLICATION, WITHOUT LONG-TERM CURRENT USE OF INSULIN: Chronic | ICD-10-CM

## 2022-03-30 DIAGNOSIS — I10 PRIMARY HYPERTENSION: Chronic | ICD-10-CM

## 2022-03-30 DIAGNOSIS — E03.9 ACQUIRED HYPOTHYROIDISM: Chronic | ICD-10-CM

## 2022-03-30 DIAGNOSIS — K21.9 GASTROESOPHAGEAL REFLUX DISEASE WITHOUT ESOPHAGITIS: ICD-10-CM

## 2022-03-30 DIAGNOSIS — E78.5 HYPERLIPIDEMIA, UNSPECIFIED HYPERLIPIDEMIA TYPE: Primary | Chronic | ICD-10-CM

## 2022-03-30 DIAGNOSIS — F43.10 POST-TRAUMATIC STRESS: Chronic | ICD-10-CM

## 2022-03-30 PROCEDURE — 99214 OFFICE O/P EST MOD 30 MIN: CPT | Performed by: FAMILY MEDICINE

## 2022-03-30 NOTE — PATIENT INSTRUCTIONS
Continue your current medications and treatment.    Have the follow up labs done and call for results.    Follow up in the offcie in 6 months.

## 2022-03-30 NOTE — PROGRESS NOTES
Subjective   Vinnie Cloud is a 61 y.o. male.     Chief Complaint   Patient presents with   • Hypertension     6 month follow up   • Coronary Artery Disease   • Diabetes     Needs diabetic foot exam       HPI  Chief complaint: Hypertension hyperlipidemia type 2 diabetes mellitus anxiety    Patient is a 61-year-old white male comes in for follow-up and maintenance of his current problems which include    1.  Vnktknlvfoqrcv-idfodn-xv is on Lipitor 40 mg daily.  He denies myalgias and arthralgias.  He denied nausea or anorexia.      2. mekjfvbshlstht-ziovje-wj is is on Synthroid 0.075 mg daily.  Denied heat or cold intolerance tremor weight gain or weight loss.  He is due for TSH.    3.  Type 2 diabetes mellitus-stable-the patient is currently on Metformin 500 mg once a day and Jardiance 25 mg daily.  He is asymptomatic.  He does not tolerate higher doses of Metformin.  He is due for an A1c.  His A1c is up been at goal.    4.  Hypertension-stable-patient is treating this with lifestyle modification.    5.  Coronary artery disease-stable-patient on aspirin 81 mg daily.  He denied chest pain shortness of breath orthopnea or PND.    6.  Posttraumatic stress/anxiety and depression-stable the patient has history of moderately severe recurrent unipolar depression with anxiety.  Patient is currently on citalopram 20 mg daily and lorazepam 0.5 mg twice a day.  He states his anxiety and depression is controlled with this.    7. gastroesophageal reflux disease-stable-the patient is currently on Protonix 40 mg daily he denies dysphagia or heartburn.        The following portions of the patient's history were reviewed and updated as appropriate: allergies, current medications, past family history, past medical history, past social history, past surgical history and problem list.    Review of Systems    Objective     /83 (BP Location: Right arm, Patient Position: Sitting, Cuff Size: Large Adult)   Pulse 72   Temp 96.8  "°F (36 °C) (Infrared)   Resp 16   Ht 172.7 cm (68\")   Wt 106 kg (233 lb)   SpO2 99%   BMI 35.43 kg/m²     Physical Exam  Vitals and nursing note reviewed.   Constitutional:       Appearance: He is well-developed. He is obese.   HENT:      Head: Normocephalic and atraumatic.   Eyes:      Pupils: Pupils are equal, round, and reactive to light.   Cardiovascular:      Rate and Rhythm: Normal rate and regular rhythm.      Pulses: Normal pulses.      Heart sounds: Normal heart sounds. No murmur heard.    No friction rub. No gallop.   Pulmonary:      Effort: Pulmonary effort is normal.      Breath sounds: Normal breath sounds.   Abdominal:      General: Bowel sounds are normal.      Palpations: Abdomen is soft.   Musculoskeletal:         General: Normal range of motion.      Cervical back: Neck supple.   Skin:     General: Skin is warm and dry.   Neurological:      Mental Status: He is alert and oriented to person, place, and time.   Psychiatric:         Behavior: Behavior normal.         Thought Content: Thought content normal.         Judgment: Judgment normal.           Assessment/Plan   Diagnoses and all orders for this visit:    1. Hyperlipidemia, unspecified hyperlipidemia type (Primary)  -     ALT; Future  -     Lipid Panel; Future    2. Primary hypertension  -     Basic Metabolic Panel; Future    3. Coronary artery disease involving native coronary artery of native heart without angina pectoris    4. Acquired hypothyroidism  -     TSH; Future    5. Type 2 diabetes mellitus without complication, without long-term current use of insulin (HCC)  -     Hemoglobin A1c; Future  -     Protein / Creatinine Ratio, Urine - Urine, Clean Catch; Future    6. Gastroesophageal reflux disease without esophagitis    7. Post-traumatic stress      Patient Instructions   Continue your current medications and treatment.    Have the follow up labs done and call for results.    Follow up in the offcie in 6 months.          Rashel THOMAS" Jr. Real MD    03/30/22

## 2022-03-31 LAB
ALT SERPL-CCNC: 17 IU/L (ref 0–44)
AMBIG ABBREV BMP8 DEFAULT: NORMAL
AMBIG ABBREV LP DEFAULT: NORMAL
BUN SERPL-MCNC: 21 MG/DL (ref 8–27)
BUN/CREAT SERPL: 20 (ref 10–24)
CALCIUM SERPL-MCNC: 9.6 MG/DL (ref 8.6–10.2)
CHLORIDE SERPL-SCNC: 100 MMOL/L (ref 96–106)
CHOLEST SERPL-MCNC: 173 MG/DL (ref 100–199)
CO2 SERPL-SCNC: 24 MMOL/L (ref 20–29)
CREAT SERPL-MCNC: 1.04 MG/DL (ref 0.76–1.27)
CREAT UR-MCNC: 75.6 MG/DL
EGFRCR SERPLBLD CKD-EPI 2021: 82 ML/MIN/1.73
GLUCOSE SERPL-MCNC: 159 MG/DL (ref 65–99)
HBA1C MFR BLD: 9.8 % (ref 4.8–5.6)
HDLC SERPL-MCNC: 52 MG/DL
LDLC SERPL CALC-MCNC: 99 MG/DL (ref 0–99)
POTASSIUM SERPL-SCNC: 4.7 MMOL/L (ref 3.5–5.2)
PROT UR-MCNC: 6.5 MG/DL
PROT/CREAT UR: 86 MG/G CREAT (ref 0–200)
SODIUM SERPL-SCNC: 141 MMOL/L (ref 134–144)
TRIGL SERPL-MCNC: 126 MG/DL (ref 0–149)
TSH SERPL DL<=0.005 MIU/L-ACNC: 1.76 UIU/ML (ref 0.45–4.5)
VLDLC SERPL CALC-MCNC: 22 MG/DL (ref 5–40)

## 2022-04-18 RX ORDER — CITALOPRAM 20 MG/1
TABLET ORAL
Qty: 90 TABLET | Refills: 1 | Status: SHIPPED | OUTPATIENT
Start: 2022-04-18 | End: 2022-10-10

## 2022-04-19 RX ORDER — ATORVASTATIN CALCIUM 40 MG/1
40 TABLET, FILM COATED ORAL DAILY
Qty: 90 TABLET | Refills: 1 | Status: SHIPPED | OUTPATIENT
Start: 2022-04-19 | End: 2022-10-10

## 2022-06-11 RX ORDER — METFORMIN HYDROCHLORIDE 500 MG/1
TABLET, EXTENDED RELEASE ORAL
Qty: 90 TABLET | Refills: 0 | Status: SHIPPED | OUTPATIENT
Start: 2022-06-11 | End: 2022-09-03

## 2022-09-03 RX ORDER — METFORMIN HYDROCHLORIDE 500 MG/1
TABLET, EXTENDED RELEASE ORAL
Qty: 90 TABLET | Refills: 0 | Status: SHIPPED | OUTPATIENT
Start: 2022-09-03 | End: 2022-09-12 | Stop reason: SDUPTHER

## 2022-09-12 RX ORDER — METFORMIN HYDROCHLORIDE 500 MG/1
500 TABLET, EXTENDED RELEASE ORAL DAILY
Qty: 90 TABLET | Refills: 0 | Status: SHIPPED | OUTPATIENT
Start: 2022-09-12 | End: 2023-01-06

## 2022-09-12 NOTE — TELEPHONE ENCOUNTER
Caller: ALIYA MARTINEZ    Relationship: Emergency Contact    Best call back number: [phone]    Requested Prescriptions:   Requested Prescriptions     Pending Prescriptions Disp Refills   • metFORMIN ER (GLUCOPHAGE-XR) 500 MG 24 hr tablet 90 tablet 0     Sig: Take 1 tablet by mouth Daily.        Pharmacy where request should be sent: [facility] [address] AT [facility] AND [facility] - [phone]  - [phone] FX     Additional details provided by patient: PHARMACY STATED THAT THIS WAS DECLINED TO REFILL, PLEASE RE-SEND.     Does the patient have less than a 3 day supply:  [] Yes  [x] No    Tanya Gleason Rep   09/12/22 08:50 EDT

## 2022-09-30 ENCOUNTER — OFFICE VISIT (OUTPATIENT)
Dept: FAMILY MEDICINE CLINIC | Facility: CLINIC | Age: 62
End: 2022-09-30

## 2022-09-30 ENCOUNTER — TELEPHONE (OUTPATIENT)
Dept: FAMILY MEDICINE CLINIC | Facility: CLINIC | Age: 62
End: 2022-09-30

## 2022-09-30 VITALS
TEMPERATURE: 96.8 F | HEART RATE: 75 BPM | BODY MASS INDEX: 35.01 KG/M2 | SYSTOLIC BLOOD PRESSURE: 139 MMHG | OXYGEN SATURATION: 97 % | WEIGHT: 231 LBS | RESPIRATION RATE: 16 BRPM | DIASTOLIC BLOOD PRESSURE: 89 MMHG | HEIGHT: 68 IN

## 2022-09-30 DIAGNOSIS — F43.10 POST-TRAUMATIC STRESS: Chronic | ICD-10-CM

## 2022-09-30 DIAGNOSIS — I10 PRIMARY HYPERTENSION: Primary | Chronic | ICD-10-CM

## 2022-09-30 DIAGNOSIS — I25.10 CORONARY ARTERY DISEASE INVOLVING NATIVE CORONARY ARTERY OF NATIVE HEART WITHOUT ANGINA PECTORIS: Chronic | ICD-10-CM

## 2022-09-30 DIAGNOSIS — E11.9 TYPE 2 DIABETES MELLITUS WITHOUT COMPLICATION, WITHOUT LONG-TERM CURRENT USE OF INSULIN: Chronic | ICD-10-CM

## 2022-09-30 DIAGNOSIS — E03.9 ACQUIRED HYPOTHYROIDISM: Chronic | ICD-10-CM

## 2022-09-30 DIAGNOSIS — E78.5 HYPERLIPIDEMIA, UNSPECIFIED HYPERLIPIDEMIA TYPE: Chronic | ICD-10-CM

## 2022-09-30 DIAGNOSIS — K02.9 CARIOUS TEETH: ICD-10-CM

## 2022-09-30 DIAGNOSIS — K21.9 GASTROESOPHAGEAL REFLUX DISEASE WITHOUT ESOPHAGITIS: ICD-10-CM

## 2022-09-30 PROCEDURE — 99214 OFFICE O/P EST MOD 30 MIN: CPT | Performed by: FAMILY MEDICINE

## 2022-09-30 RX ORDER — SEMAGLUTIDE 1.34 MG/ML
0.5 INJECTION, SOLUTION SUBCUTANEOUS WEEKLY
Qty: 1.5 ML | Refills: 3 | Status: SHIPPED | OUTPATIENT
Start: 2022-09-30 | End: 2022-09-30

## 2022-09-30 RX ORDER — AMOXICILLIN 875 MG/1
875 TABLET, COATED ORAL 2 TIMES DAILY
Qty: 20 TABLET | Refills: 0 | Status: SHIPPED | OUTPATIENT
Start: 2022-09-30 | End: 2023-01-06

## 2022-09-30 RX ORDER — SEMAGLUTIDE 1.34 MG/ML
0.5 INJECTION, SOLUTION SUBCUTANEOUS WEEKLY
Qty: 1.5 ML | Refills: 3 | Status: SHIPPED | OUTPATIENT
Start: 2022-09-30 | End: 2023-03-29 | Stop reason: SDUPTHER

## 2022-09-30 NOTE — PROGRESS NOTES
"Subjective   Vinnie Cloud is a 61 y.o. male.     Chief Complaint   Patient presents with   • Hypertension     6 mth f/u   • Diabetes   • Hyperlipidemia   • Facial Pain     Right side started a week and half ago       HPI  Chief complaint: Hypertension hyperlipidemia coronary artery disease type 2 diabetes mellitus hypothyroidism    The patient is a 61-year-old white male comes in for follow-up and maintenance of his current problems which include    1.  Hypertension-stable-the patient is currently on no medication.  Blood pressures controlled with lifestyle modification.    2.  Hyperlipidemia-stable-the patient is on Lipitor 40 mg daily.  He denies myalgias and arthralgias.    3.  Hypothyroidism-stable Adapin is on Synthroid 0.075 mg daily.  He denied heat or cold intolerance tremor weight gain or weight loss.    4.  Type 2 diabetes mellitus-deteriorated-the patient is on metformin 500 mg daily he does not tolerate higher doses.  He also is on Jardiance 25 mg once a day.  His blood sugars are not controlled.  He states he would like to try Ozempic in addition to his current medications.    5.  Gastroesophageal reflux disease-stable-patient on Protonix 40 mg daily.  He denied dysphagia or heartburn.    6.  Coronary artery disease-stable- is on aspirin 81 mg daily..  He denied chest pain shortness of breath orthopnea or PND.      The following portions of the patient's history were reviewed and updated as appropriate: allergies, current medications, past family history, past medical history, past social history, past surgical history and problem list.    Review of Systems    Objective     /89 (BP Location: Right arm, Patient Position: Sitting, Cuff Size: Large Adult)   Pulse 75   Temp 96.8 °F (36 °C) (Infrared)   Resp 16   Ht 172.7 cm (68\")   Wt 105 kg (231 lb)   SpO2 97%   BMI 35.12 kg/m²     Physical Exam  Vitals and nursing note reviewed.   Constitutional:       Appearance: He is well-developed. "   HENT:      Head: Normocephalic and atraumatic.      Right Ear: Tympanic membrane normal.      Left Ear: Tympanic membrane normal.      Nose: Nose normal.      Mouth/Throat:      Mouth: Mucous membranes are moist.      Pharynx: Oropharynx is clear.      Comments: Numerous carious teeth  Right  upper premolar very tender to light pressure  Eyes:      Pupils: Pupils are equal, round, and reactive to light.   Cardiovascular:      Rate and Rhythm: Normal rate and regular rhythm.      Pulses: Normal pulses.      Heart sounds: Normal heart sounds. No murmur heard.    No friction rub. No gallop.   Pulmonary:      Effort: Pulmonary effort is normal.      Breath sounds: Normal breath sounds.   Abdominal:      General: Bowel sounds are normal.      Palpations: Abdomen is soft.   Musculoskeletal:         General: Normal range of motion.      Cervical back: Neck supple.   Skin:     General: Skin is warm and dry.   Neurological:      Mental Status: He is alert and oriented to person, place, and time.   Psychiatric:         Behavior: Behavior normal.         Thought Content: Thought content normal.         Judgment: Judgment normal.           Assessment & Plan   Diagnoses and all orders for this visit:    1. Primary hypertension (Primary)    2. Hyperlipidemia, unspecified hyperlipidemia type    3. Coronary artery disease involving native coronary artery of native heart without angina pectoris    4. Type 2 diabetes mellitus without complication, without long-term current use of insulin (HCC)  -     Basic Metabolic Panel; Future  -     Hemoglobin A1c; Future    5. Acquired hypothyroidism    6. Gastroesophageal reflux disease without esophagitis    7. Post-traumatic stress    8. Carious teeth    Other orders  -     Semaglutide,0.25 or 0.5MG/DOS, (Ozempic, 0.25 or 0.5 MG/DOSE,) 2 MG/1.5ML solution pen-injector; Inject 0.5 mg under the skin into the appropriate area as directed 1 (One) Time Per Week.  Dispense: 1.5 mL; Refill: 3  -      amoxicillin (AMOXIL) 875 MG tablet; Take 1 tablet by mouth 2 (Two) Times a Day.  Dispense: 20 tablet; Refill: 0      Patient Instructions   Continue your current medications and  treatment; and start taking the Ozempic.    Follow  in the office in 3 months.    Take the anti-biotics  as prescribed.    Have the follow up labs done and call for results.      Rashel Real Jr., MD    09/30/22

## 2022-09-30 NOTE — PATIENT INSTRUCTIONS
Continue your current medications and  treatment; and start taking the Ozempic.    Follow  in the office in 3 months.    Take the anti-biotics  as prescribed.    Have the follow up labs done and call for results.

## 2022-09-30 NOTE — TELEPHONE ENCOUNTER
Caller: ALIYA MARTINEZ    Relationship: Emergency Contact    Best call back number: [phone]     What was the call regarding: ALIYA IS CALLING IN REGARDS TO PATIENT'S MEDICATION CHANGES. DR. WILD SAW PATIENT TODAY AND PUT HIM ON Semaglutide,0.25 or 0.5MG/DOS, (Ozempic, 0.25 or 0.5 MG/DOSE,) 2 MG/1.5ML solution pen-injector. PATIENT'S WIFE IN INQUIRING IF HE IS TO CONTINUE ON Jardiance 25 MG tablet tablet AND metFORMIN ER (GLUCOPHAGE-XR) 500 MG 24 hr tablet. PLEASE CALL AND ADVISE PATIENT WHAT MEDICATIONS THAT HE NEEDS TO CONTINUE TO TAKE WITH THE OZEMPIC.    Do you require a callback: YES

## 2022-10-07 RX ORDER — PANTOPRAZOLE SODIUM 40 MG/1
40 TABLET, DELAYED RELEASE ORAL DAILY
Qty: 90 TABLET | Refills: 1 | Status: SHIPPED | OUTPATIENT
Start: 2022-10-07

## 2022-10-10 RX ORDER — ATORVASTATIN CALCIUM 40 MG/1
40 TABLET, FILM COATED ORAL DAILY
Qty: 90 TABLET | Refills: 1 | Status: SHIPPED | OUTPATIENT
Start: 2022-10-10

## 2022-10-10 RX ORDER — CITALOPRAM 20 MG/1
TABLET ORAL
Qty: 90 TABLET | Refills: 1 | Status: SHIPPED | OUTPATIENT
Start: 2022-10-10

## 2022-12-06 ENCOUNTER — TELEPHONE (OUTPATIENT)
Dept: FAMILY MEDICINE CLINIC | Facility: CLINIC | Age: 62
End: 2022-12-06

## 2022-12-06 DIAGNOSIS — M79.671 RIGHT FOOT PAIN: Primary | ICD-10-CM

## 2022-12-06 NOTE — TELEPHONE ENCOUNTER
Caller: ALIYA MARTINEZ    Relationship: Emergency Contact    Best call back number:[phone] (Mobile)    What orders are you requesting (i.e. lab or imaging): X-RAY OF RIGHT FOOT     In what timeframe would the patient need to come in: TODAY    Where will you receive your lab/imaging services:     Additional notes:  PLEASE CONTACT ALIYA TO ADVISE.          THANKS

## 2022-12-06 NOTE — TELEPHONE ENCOUNTER
I spoke with the patient's wife.  Patient has foot pain but no injury.  He requests an x-ray.  I wrote the order.  He is to be seen in the office.

## 2022-12-07 ENCOUNTER — HOSPITAL ENCOUNTER (OUTPATIENT)
Dept: GENERAL RADIOLOGY | Facility: HOSPITAL | Age: 62
Discharge: HOME | End: 2022-12-07
Admitting: FAMILY MEDICINE

## 2022-12-07 DIAGNOSIS — M79.671 RIGHT FOOT PAIN: ICD-10-CM

## 2022-12-07 PROCEDURE — 73630 X-RAY EXAM OF FOOT: CPT

## 2022-12-08 RX ORDER — EMPAGLIFLOZIN 25 MG/1
TABLET, FILM COATED ORAL
Qty: 90 TABLET | Refills: 0 | Status: SHIPPED | OUTPATIENT
Start: 2022-12-08

## 2023-01-06 ENCOUNTER — OFFICE VISIT (OUTPATIENT)
Dept: FAMILY MEDICINE CLINIC | Facility: CLINIC | Age: 63
End: 2023-01-06
Payer: COMMERCIAL

## 2023-01-06 VITALS
TEMPERATURE: 96.6 F | BODY MASS INDEX: 33.81 KG/M2 | HEART RATE: 70 BPM | OXYGEN SATURATION: 98 % | WEIGHT: 223.1 LBS | DIASTOLIC BLOOD PRESSURE: 72 MMHG | RESPIRATION RATE: 18 BRPM | SYSTOLIC BLOOD PRESSURE: 112 MMHG | HEIGHT: 68 IN

## 2023-01-06 DIAGNOSIS — F43.10 POST-TRAUMATIC STRESS: Chronic | ICD-10-CM

## 2023-01-06 DIAGNOSIS — E78.5 HYPERLIPIDEMIA, UNSPECIFIED HYPERLIPIDEMIA TYPE: Chronic | ICD-10-CM

## 2023-01-06 DIAGNOSIS — E11.9 TYPE 2 DIABETES MELLITUS WITHOUT COMPLICATION, WITHOUT LONG-TERM CURRENT USE OF INSULIN: Chronic | ICD-10-CM

## 2023-01-06 DIAGNOSIS — I25.10 CORONARY ARTERY DISEASE INVOLVING NATIVE CORONARY ARTERY OF NATIVE HEART WITHOUT ANGINA PECTORIS: Chronic | ICD-10-CM

## 2023-01-06 DIAGNOSIS — I10 PRIMARY HYPERTENSION: Primary | Chronic | ICD-10-CM

## 2023-01-06 DIAGNOSIS — E03.9 ACQUIRED HYPOTHYROIDISM: Chronic | ICD-10-CM

## 2023-01-06 DIAGNOSIS — K21.9 GASTROESOPHAGEAL REFLUX DISEASE WITHOUT ESOPHAGITIS: ICD-10-CM

## 2023-01-06 PROCEDURE — 99214 OFFICE O/P EST MOD 30 MIN: CPT | Performed by: FAMILY MEDICINE

## 2023-01-06 NOTE — PATIENT INSTRUCTIONS
Continue your current medications and treatment.    Have the follow up labs done and call for results.    Follow up in the office in 6 months.

## 2023-01-06 NOTE — PROGRESS NOTES
Subjective   Vinnie Cloud is a 62 y.o. male.     Chief Complaint   Patient presents with   • Diabetes     3 month follow up / Discuss medication    • Hyperlipidemia   • Hypertension       HPI hypertension hyperlipidemia coronary artery disease gastroesophageal reflux disease posttraumatic stress disorder    The patient is a 62-year-old white male comes in for follow-up and maintenance of his current problems which include    1. hypertension-stable-the patient is currently on lifestyle modification.  Patient's blood pressure stable.    2.  Hyperlipidemia-stable after patient on Lipitor 40 mg daily.  He denies myalgias and arthralgias.  His cholesterol is to goal.    3.  Coronary artery disease-stable-patient is on aspirin 81 mg daily.  Denies chest pain or shortness of breath.    4.  Type 2 diabetes mellitus-stable-the patient is on Ozempic 0.5 mg weekly and Jardiance 25 mg daily.  The patient quit taking metformin.  He states his blood sugars are down significantly.  Patient also has lost 10 pounds in the past several months.    5.  Hypothyroidism-stable-patient on Synthroid 0.75 mg daily.  He denied heat or cold intolerance tremor weight gain or weight loss.      6. gastroesophageal reflux disease-stable-the patient is currently on Protonix 40 mg daily.  He states helps to control his symptoms of heartburn.    7.  Posttraumatic stress-stable-   Patient is currently on citalopram 20 mg daily and Ativan 0.5 mg twice a day as needed.  His symptoms are controlled.          The following portions of the patient's history were reviewed and updated as appropriate: allergies, current medications, past family history, past medical history, past social history, past surgical history and problem list.    Review of Systems    Objective     /72   Pulse 70   Temp 96.6 °F (35.9 °C) (Infrared)   Resp 18   Ht 172.7 cm (68\")   Wt 101 kg (223 lb 1.6 oz)   SpO2 98%   BMI 33.92 kg/m²     Physical Exam  Vitals and  nursing note reviewed.   Constitutional:       Appearance: He is well-developed.   HENT:      Head: Normocephalic and atraumatic.   Eyes:      Pupils: Pupils are equal, round, and reactive to light.   Cardiovascular:      Rate and Rhythm: Normal rate and regular rhythm.      Pulses: Normal pulses.      Heart sounds: Normal heart sounds. No murmur heard.    No friction rub. No gallop.   Pulmonary:      Effort: Pulmonary effort is normal.      Breath sounds: Normal breath sounds.   Abdominal:      General: Bowel sounds are normal.      Palpations: Abdomen is soft.   Musculoskeletal:         General: Normal range of motion.      Cervical back: Neck supple.   Skin:     General: Skin is warm and dry.   Neurological:      Mental Status: He is alert and oriented to person, place, and time.   Psychiatric:         Behavior: Behavior normal.         Thought Content: Thought content normal.         Judgment: Judgment normal.           Assessment & Plan   Diagnoses and all orders for this visit:    1. Primary hypertension (Primary)    2. Hyperlipidemia, unspecified hyperlipidemia type    3. Coronary artery disease involving native coronary artery of native heart without angina pectoris    4. Type 2 diabetes mellitus without complication, without long-term current use of insulin (HCC)    5. Acquired hypothyroidism    6. Gastroesophageal reflux disease without esophagitis    7. Post-traumatic stress      Patient Instructions   Continue your current medications and treatment.    Have the follow up labs done and call for results.    Follow up in the office in 6 months.          Rashel Real Jr., MD    01/06/23

## 2023-01-07 LAB
AMBIG ABBREV BMP8 DEFAULT: NORMAL
BUN SERPL-MCNC: 24 MG/DL (ref 8–27)
BUN/CREAT SERPL: 26 (ref 10–24)
CALCIUM SERPL-MCNC: 9.1 MG/DL (ref 8.6–10.2)
CHLORIDE SERPL-SCNC: 102 MMOL/L (ref 96–106)
CO2 SERPL-SCNC: 24 MMOL/L (ref 20–29)
CREAT SERPL-MCNC: 0.92 MG/DL (ref 0.76–1.27)
EGFRCR SERPLBLD CKD-EPI 2021: 95 ML/MIN/1.73
GLUCOSE SERPL-MCNC: 145 MG/DL (ref 70–99)
HBA1C MFR BLD: 8 % (ref 4.8–5.6)
POTASSIUM SERPL-SCNC: 4.3 MMOL/L (ref 3.5–5.2)
SODIUM SERPL-SCNC: 142 MMOL/L (ref 134–144)

## 2023-01-13 RX ORDER — LEVOTHYROXINE SODIUM 0.07 MG/1
TABLET ORAL
Qty: 90 TABLET | Refills: 0 | Status: SHIPPED | OUTPATIENT
Start: 2023-01-13

## 2023-01-24 ENCOUNTER — TELEPHONE (OUTPATIENT)
Dept: FAMILY MEDICINE CLINIC | Facility: CLINIC | Age: 63
End: 2023-01-24
Payer: COMMERCIAL

## 2023-01-24 RX ORDER — AMOXICILLIN 875 MG/1
875 TABLET, COATED ORAL 2 TIMES DAILY
Qty: 20 TABLET | Refills: 0 | Status: SHIPPED | OUTPATIENT
Start: 2023-01-24

## 2023-01-24 NOTE — TELEPHONE ENCOUNTER
Caller: ALIYA MARTINEZ    Relationship: Emergency Contact    Best call back number: [phone]      What medication are you requesting: ANTIBIOTIC    What are your current symptoms: ABECESS ON TOOTH    How long have you been experiencing symptoms: NOTICED IT YESTERDAY.    Have you had these symptoms before:    [x] Yes  [] No    Have you been treated for these symptoms before:   [x] Yes  [] No    If a prescription is needed, what is your preferred pharmacy and phone number: [place], [place] - [address] AT [facility] [facility] AND [facility] - [phone] [facility] [phone] FX     Additional notes:    PATIENTS WIFE IS ASKING TO GET ANTIBIOTIC SENT IN FOR THIS TO HELP WITH THE INFECTION SO THAT HE CAN THEN GO TO [facility] TO HAVE IT TAKEN CARE OF.

## 2023-03-29 ENCOUNTER — TELEPHONE (OUTPATIENT)
Dept: FAMILY MEDICINE CLINIC | Facility: CLINIC | Age: 63
End: 2023-03-29
Payer: COMMERCIAL

## 2023-03-29 RX ORDER — SEMAGLUTIDE 1.34 MG/ML
0.5 INJECTION, SOLUTION SUBCUTANEOUS WEEKLY
Qty: 1.5 ML | Refills: 3 | Status: SHIPPED | OUTPATIENT
Start: 2023-03-29

## 2023-03-29 NOTE — TELEPHONE ENCOUNTER
Incoming Refill Request      Medication requested (name and dose):   Semaglutide,0.25 or 0.5MG/DOS, (Ozempic, 0.25 or 0.5 MG/DOSE,) 2 MG/1.5ML solution pen-injector  0.5 mg, Weekly         Pharmacy where request should be sent: [facility] [address] AND [facility] - [phone]  - [phone]   [phone]    Additional details provided by patient: REQUESTED 90 DAY SUPPLY, AND SAID INSURANCE WOULD COVER IT    Best call back number: [phone]    Does the patient have less than a 3 day supply:  [x] Yes  [] No    Tanya Schwartz Rep  03/29/23, 10:35 EDT

## 2023-04-19 RX ORDER — EMPAGLIFLOZIN 25 MG/1
TABLET, FILM COATED ORAL
Qty: 90 TABLET | Refills: 0 | Status: SHIPPED | OUTPATIENT
Start: 2023-04-19

## 2023-04-19 RX ORDER — ATORVASTATIN CALCIUM 40 MG/1
40 TABLET, FILM COATED ORAL DAILY
Qty: 90 TABLET | Refills: 1 | Status: SHIPPED | OUTPATIENT
Start: 2023-04-19

## 2023-04-19 RX ORDER — PANTOPRAZOLE SODIUM 40 MG/1
TABLET, DELAYED RELEASE ORAL
Qty: 90 TABLET | Refills: 1 | Status: SHIPPED | OUTPATIENT
Start: 2023-04-19

## 2023-04-19 RX ORDER — LEVOTHYROXINE SODIUM 0.07 MG/1
TABLET ORAL
Qty: 90 TABLET | Refills: 0 | Status: SHIPPED | OUTPATIENT
Start: 2023-04-19

## 2023-04-19 RX ORDER — CITALOPRAM 20 MG/1
TABLET ORAL
Qty: 90 TABLET | Refills: 1 | Status: SHIPPED | OUTPATIENT
Start: 2023-04-19

## 2023-06-02 RX ORDER — EMPAGLIFLOZIN 10 MG/1
10 TABLET, FILM COATED ORAL DAILY
Qty: 30 TABLET | Refills: 2 | Status: CANCELLED | OUTPATIENT
Start: 2023-06-02

## 2023-06-09 ENCOUNTER — TELEPHONE (OUTPATIENT)
Dept: FAMILY MEDICINE CLINIC | Facility: CLINIC | Age: 63
End: 2023-06-09

## 2023-06-09 NOTE — TELEPHONE ENCOUNTER
Caller: ALIYA MARTINEZ    Relationship: Emergency Contact    Best call back number: [phone]     What is the best time to reach you: ANY TIME    Who are you requesting to speak with (clinical staff, provider,  specific staff member): CLINICAL STAFF    What was the call regarding: PATIENT'S WIFE CALLED STATING HIS OZEMPIC PRESCRIPTION NEEDS PRIOR AUTHORIZATION. HE HAS NEW INSURANCE AND IT WILL GO THROUGH [facility]    Is it okay if the provider responds through MyChart: YES

## 2023-07-06 PROBLEM — G56.22 LESION OF LEFT ULNAR NERVE: Status: RESOLVED | Noted: 2021-12-30 | Resolved: 2023-07-06

## 2023-07-29 RX ORDER — SEMAGLUTIDE 0.68 MG/ML
0.5 INJECTION, SOLUTION SUBCUTANEOUS
Qty: 3 ML | Refills: 1 | Status: SHIPPED | OUTPATIENT
Start: 2023-07-29

## 2023-07-29 RX ORDER — LEVOTHYROXINE SODIUM 0.07 MG/1
75 TABLET ORAL DAILY
Qty: 90 TABLET | Refills: 0 | Status: SHIPPED | OUTPATIENT
Start: 2023-07-29

## 2023-08-08 ENCOUNTER — OFFICE VISIT (OUTPATIENT)
Dept: FAMILY MEDICINE CLINIC | Facility: CLINIC | Age: 63
End: 2023-08-08
Payer: COMMERCIAL

## 2023-08-08 VITALS
HEIGHT: 68 IN | TEMPERATURE: 98.2 F | SYSTOLIC BLOOD PRESSURE: 126 MMHG | OXYGEN SATURATION: 98 % | WEIGHT: 223 LBS | HEART RATE: 75 BPM | DIASTOLIC BLOOD PRESSURE: 83 MMHG | BODY MASS INDEX: 33.8 KG/M2

## 2023-08-08 DIAGNOSIS — I60.9 SUBARACHNOID HEMORRHAGE: ICD-10-CM

## 2023-08-08 DIAGNOSIS — S09.90XA CLOSED HEAD INJURY, INITIAL ENCOUNTER: Primary | ICD-10-CM

## 2023-08-08 DIAGNOSIS — M54.50 ACUTE MIDLINE LOW BACK PAIN WITHOUT SCIATICA: ICD-10-CM

## 2023-08-08 RX ORDER — CYCLOBENZAPRINE HCL 10 MG
10 TABLET ORAL 3 TIMES DAILY PRN
COMMUNITY

## 2023-08-08 NOTE — PROGRESS NOTES
"Nano Cloud is a 62 y.o. male.     Chief Complaint   Patient presents with    Hospital Follow Up Visit     TCM       DAX chief complaint: Hospital follow-up    The patient is a 62-year-old white male comes in for hospital follow-up.    The patient states that 2 days ago he slipped on pavement at [facility].  He states that his feet went out from under him.  He states that he landed on his back and struck the back of his head.  The patient lost consciousness and initially could not say his name or date or where he was.  Patient regained his cognitive abilities within about 2 hours.    Patient was taken to the emergency room and was admitted to the hospital overnight.  He was kept in ICU overnight.  The patient had a CT scan of the head and neck and CT of his lower back.  CT of the neck did not reveal fracture CT scan of the lower back did not reveal a fracture CT scan of the head revealed subarachnoid hemorrhage that was stable.  He was discharged home.    Patient was advised not to take aspirin for a week or ibuprofen for a week.  Patient was advised to continue his current medications.    Patient states that he does not have headache.  He states that he is gradually increasing his activities and getting around okay.          The following portions of the patient's history were reviewed and updated as appropriate: allergies, current medications, past family history, past medical history, past social history, past surgical history and problem list.    Review of Systems    Objective     /83 (BP Location: Left arm, Patient Position: Sitting, Cuff Size: Adult)   Pulse 75   Temp 98.2 øF (36.8 øC) (Infrared)   Ht 172.7 cm (68\")   Wt 101 kg (223 lb)   SpO2 98%   BMI 33.91 kg/mý     Physical Exam  Vitals and nursing note reviewed.   Constitutional:       Appearance: He is well-developed. He is obese.   HENT:      Head: Normocephalic and atraumatic.      Right Ear: Tympanic membrane " normal.      Left Ear: Tympanic membrane normal.      Nose: Nose normal.      Mouth/Throat:      Mouth: Mucous membranes are moist.      Pharynx: Oropharynx is clear.   Eyes:      Extraocular Movements: Extraocular movements intact.      Conjunctiva/sclera: Conjunctivae normal.      Pupils: Pupils are equal, round, and reactive to light.   Cardiovascular:      Rate and Rhythm: Normal rate and regular rhythm.      Pulses: Normal pulses.      Heart sounds: Normal heart sounds. No murmur heard.    No friction rub. No gallop.   Pulmonary:      Effort: Pulmonary effort is normal.      Breath sounds: Normal breath sounds.   Abdominal:      General: Bowel sounds are normal.      Palpations: Abdomen is soft.   Musculoskeletal:         General: Normal range of motion.      Cervical back: Neck supple.   Skin:     General: Skin is warm and dry.   Neurological:      Mental Status: He is alert and oriented to person, place, and time.      Cranial Nerves: No cranial nerve deficit.      Sensory: No sensory deficit.      Motor: No weakness.      Coordination: Coordination normal.      Gait: Gait normal.      Deep Tendon Reflexes: Reflexes normal.   Psychiatric:         Behavior: Behavior normal.         Thought Content: Thought content normal.         Judgment: Judgment normal.         Assessment & Plan   Diagnoses and all orders for this visit:    1. Closed head injury, initial encounter (Primary)-I recommended consultation with neurosurgery  -     Ambulatory Referral to Neurosurgery    2. Subarachnoid hemorrhage-I recommended consultation with neurosurgery.  He is to gradually resume his regular activities.  He is not to operate a motor vehicle.  He is not to take walks without being accompanied by another person.  Patient is to have a follow-up CT of the head in 2 weeks.  -     Ambulatory Referral to Neurosurgery    3. Acute midline low back pain without sciatica      Patient Instructions   Continue your current medications and  treatment.    Gradually resume your regular activities.    Follow up with neurosurgery.    Follow up in the office in 2 weeks.      Rashel Real Jr., MD    08/08/23

## 2023-08-08 NOTE — PATIENT INSTRUCTIONS
Continue your current medications and treatment.    Gradually resume your regular activities.    Follow up with neurosurgery.    Follow up in the office in 2 weeks.

## 2023-08-09 ENCOUNTER — TELEPHONE (OUTPATIENT)
Dept: FAMILY MEDICINE CLINIC | Facility: CLINIC | Age: 63
End: 2023-08-09
Payer: COMMERCIAL

## 2023-08-09 DIAGNOSIS — I60.9 SUBARACHNOID HEMORRHAGE: Primary | ICD-10-CM

## 2023-08-09 DIAGNOSIS — S09.90XA CLOSED HEAD INJURY, INITIAL ENCOUNTER: ICD-10-CM

## 2023-08-22 ENCOUNTER — OFFICE VISIT (OUTPATIENT)
Dept: FAMILY MEDICINE CLINIC | Facility: CLINIC | Age: 63
End: 2023-08-22
Payer: COMMERCIAL

## 2023-08-22 VITALS
OXYGEN SATURATION: 98 % | TEMPERATURE: 98.4 F | WEIGHT: 223 LBS | SYSTOLIC BLOOD PRESSURE: 134 MMHG | DIASTOLIC BLOOD PRESSURE: 87 MMHG | HEIGHT: 68 IN | RESPIRATION RATE: 16 BRPM | HEART RATE: 73 BPM | BODY MASS INDEX: 33.8 KG/M2

## 2023-08-22 DIAGNOSIS — I60.9 SUBARACHNOID HEMORRHAGE: ICD-10-CM

## 2023-08-22 DIAGNOSIS — S09.90XA CLOSED HEAD INJURY, INITIAL ENCOUNTER: Primary | ICD-10-CM

## 2023-08-22 DIAGNOSIS — M54.50 ACUTE MIDLINE LOW BACK PAIN WITHOUT SCIATICA: ICD-10-CM

## 2023-08-22 PROCEDURE — 99213 OFFICE O/P EST LOW 20 MIN: CPT | Performed by: FAMILY MEDICINE

## 2023-08-22 NOTE — PROGRESS NOTES
"Nano Cloud is a 62 y.o. male.     Chief Complaint   Patient presents with    Head Injury     2 wk f/u       HPI chief complaint: Closed head injury/subarachnoid hemorrhage/low back pain    The patient is a 62-year-old white male who comes in for follow-up of closed head injury and subarachnoid hemorrhage.    Patient states that he has not had any headaches.  He denied weakness or numbness on one side of the body of the other.  Patient states he is able to be out walking without difficulty.  Patient has not had a follow-up CT of his head.  Patient does not seen neurosurgery.    Patient also complains of low back pain when he walks.  He denied neurogenic claudication or pain in the lower extremities.  He states his back gets tight when he walks.          The following portions of the patient's history were reviewed and updated as appropriate: allergies, current medications, past family history, past medical history, past social history, past surgical history and problem list.    Review of Systems    Objective     /87 (BP Location: Left arm, Patient Position: Sitting, Cuff Size: Large Adult)   Pulse 73   Temp 98.4 øF (36.9 øC) (Temporal)   Resp 16   Ht 172.7 cm (68\")   Wt 101 kg (223 lb)   SpO2 98%   BMI 33.91 kg/mý     Physical Exam  Vitals and nursing note reviewed.   Constitutional:       Appearance: He is well-developed.   HENT:      Head: Normocephalic and atraumatic.   Eyes:      Pupils: Pupils are equal, round, and reactive to light.   Cardiovascular:      Rate and Rhythm: Normal rate and regular rhythm.      Pulses: Normal pulses.      Heart sounds: Normal heart sounds. No murmur heard.    No friction rub. No gallop.   Pulmonary:      Effort: Pulmonary effort is normal.      Breath sounds: Normal breath sounds.   Abdominal:      General: Bowel sounds are normal.      Palpations: Abdomen is soft.   Musculoskeletal:         General: Normal range of motion.      Cervical back: " Neck supple.   Skin:     General: Skin is warm and dry.   Neurological:      Mental Status: He is oriented to person, place, and time.   Psychiatric:         Behavior: Behavior normal.         Thought Content: Thought content normal.         Judgment: Judgment normal.         Assessment & Plan   Diagnoses and all orders for this visit:    1. Closed head injury, initial encounter (Primary)    2. Subarachnoid hemorrhage-the patient is to gradually resume regular activities.  He is not to drive yet.  He is to follow-up with neurosurgery whenever he can get an appointment he is to follow-up with me in 4 week.    3. Acute midline low back pain without sciatica      Patient Instructions   Gradually resume your regular activities.    Follow up in the office in  4 weeks.    Rashel Real Jr., MD    08/22/23

## 2023-08-29 ENCOUNTER — HOSPITAL ENCOUNTER (OUTPATIENT)
Dept: CT IMAGING | Facility: HOSPITAL | Age: 63
Discharge: HOME | End: 2023-08-29
Admitting: FAMILY MEDICINE
Payer: COMMERCIAL

## 2023-08-29 DIAGNOSIS — I60.9 SUBARACHNOID HEMORRHAGE: ICD-10-CM

## 2023-08-29 DIAGNOSIS — S09.90XA CLOSED HEAD INJURY, INITIAL ENCOUNTER: ICD-10-CM

## 2023-08-29 PROCEDURE — 70450 CT HEAD/BRAIN W/O DYE: CPT

## 2023-09-14 ENCOUNTER — TELEPHONE (OUTPATIENT)
Dept: FAMILY MEDICINE CLINIC | Facility: CLINIC | Age: 63
End: 2023-09-14
Payer: COMMERCIAL

## 2023-09-14 NOTE — TELEPHONE ENCOUNTER
Caller: ALIYA MARTINEZ    Relationship: Emergency Contact    Best call back number:     ALIYA MARTINEZ () [phone] (Mobile)         What was the call regarding: CHECKING ON STATUS OF FMLA PAPERS     LEFT AT OFFICE TUESDAY     Is it okay if the provider responds through MyChart: CALL PLEASE

## 2023-09-21 ENCOUNTER — TELEPHONE (OUTPATIENT)
Dept: FAMILY MEDICINE CLINIC | Facility: CLINIC | Age: 63
End: 2023-09-21

## 2023-09-21 ENCOUNTER — OFFICE VISIT (OUTPATIENT)
Dept: FAMILY MEDICINE CLINIC | Facility: CLINIC | Age: 63
End: 2023-09-21
Payer: COMMERCIAL

## 2023-09-21 VITALS
OXYGEN SATURATION: 97 % | RESPIRATION RATE: 16 BRPM | HEIGHT: 68 IN | HEART RATE: 66 BPM | BODY MASS INDEX: 34.25 KG/M2 | TEMPERATURE: 98.4 F | DIASTOLIC BLOOD PRESSURE: 81 MMHG | SYSTOLIC BLOOD PRESSURE: 139 MMHG | WEIGHT: 226 LBS

## 2023-09-21 DIAGNOSIS — S09.90XD CLOSED HEAD INJURY, SUBSEQUENT ENCOUNTER: Primary | ICD-10-CM

## 2023-09-21 DIAGNOSIS — E11.9 TYPE 2 DIABETES MELLITUS WITHOUT COMPLICATION, WITHOUT LONG-TERM CURRENT USE OF INSULIN: Chronic | ICD-10-CM

## 2023-09-21 DIAGNOSIS — I60.9 SUBARACHNOID HEMORRHAGE: ICD-10-CM

## 2023-09-21 RX ORDER — SEMAGLUTIDE 0.68 MG/ML
1 INJECTION, SOLUTION SUBCUTANEOUS
Qty: 3 ML | Refills: 1 | Status: SHIPPED | OUTPATIENT
Start: 2023-09-21

## 2023-09-21 NOTE — PROGRESS NOTES
"Subjective   Vinnie Cloud is a 62 y.o. male.     Chief Complaint   Patient presents with    Head Injury     1 mth f/u       HPI chief complaint: Closed head injury/subarachnoid hemorrhage, type 2 diabetes mellitus    Patient is a 62-year-old white male comes in for follow-up and maintenance of his current problems which include    1. closed head injury/subarachnoid hemorrhage-improved-the patient has been followed by me recently following closed head injury and subarachnoid bleeding from this.  Patient denied headache.  He denied weakness or numbness on one side of the body of the other.  He denied problems processing information or speech disorder.  The patient most recent CT revealed that the hemorrhage was very small.  He is due for follow-up CT.    2.  Type 2 diabetes mellitus-deteriorated-the patient is currently on Jardiance 25 mg daily and Ozempic 0.5 mg weekly.  His A1c is not quite to goal.  He can increase the dose of his Ozempic to 1 mg weekly.  He is to have a follow-up BMP and A1c in 3 months.    His other problems include hypertension hyperlipidemia coronary artery disease and posttraumatic stress disorder.        The following portions of the patient's history were reviewed and updated as appropriate: allergies, current medications, past family history, past medical history, past social history, past surgical history and problem list.    Review of Systems    Objective     /81 (BP Location: Right arm, Patient Position: Sitting, Cuff Size: Large Adult)   Pulse 66   Temp 98.4 °F (36.9 °C) (Temporal)   Resp 16   Ht 172.7 cm (68\")   Wt 103 kg (226 lb)   SpO2 97%   BMI 34.36 kg/m²     Physical Exam  Vitals and nursing note reviewed.   Constitutional:       Appearance: He is well-developed.   HENT:      Head: Normocephalic and atraumatic.   Eyes:      Pupils: Pupils are equal, round, and reactive to light.   Cardiovascular:      Rate and Rhythm: Normal rate and regular rhythm.      Heart " sounds: Normal heart sounds.   Pulmonary:      Effort: Pulmonary effort is normal.      Breath sounds: Normal breath sounds.   Abdominal:      General: Bowel sounds are normal.      Palpations: Abdomen is soft.   Musculoskeletal:         General: Normal range of motion.      Cervical back: Neck supple.   Skin:     General: Skin is warm and dry.   Neurological:      General: No focal deficit present.      Mental Status: He is oriented to person, place, and time.   Psychiatric:         Behavior: Behavior normal.         Thought Content: Thought content normal.         Judgment: Judgment normal.         Assessment & Plan   Diagnoses and all orders for this visit:    1. Closed head injury, subsequent encounter (Primary)  -     CT Head Without Contrast; Future    2. Subarachnoid hemorrhage  -     CT Head Without Contrast; Future    3. Type 2 diabetes mellitus without complication, without long-term current use of insulin-increase the dose of the Ozempic to 1 mg weekly.    Other orders  -     Semaglutide,0.25 or 0.5MG/DOS, (Ozempic, 0.25 or 0.5 MG/DOSE,) 2 MG/3ML solution pen-injector; Inject 1 mg under the skin into the appropriate area as directed Every 7 (Seven) Days.  Dispense: 3 mL; Refill: 1      Patient Instructions   Continue your current medications and treatment...    Have the follow up labs done and call for results...    Follow up in the office in 3 months.    Rashel Real Jr., MD    09/21/23

## 2023-09-21 NOTE — TELEPHONE ENCOUNTER
Caller: AUREA    Relationship to patient: Other    Best call back number: [phone]    Patient is needing:     GUERDA FROM [facility] CALLED AND SAID THEY ARE LOOKING INTO ACCIDENT CLAIM ON THIS PATIENT, THEY RECEIVED A FROM FROM DR. WILD ON 09/15/23 BUT HAD SOME QUESTIONS REGARDING IT    THEY NEED TO VERIFY THE DIAGNOSIS AND WHETHER THE INDIVIDUAL WAS HOSPITALIZED MORE THAN 20 HOURS FOR THIS ACCIDENT

## 2023-10-02 ENCOUNTER — TELEPHONE (OUTPATIENT)
Dept: FAMILY MEDICINE CLINIC | Facility: CLINIC | Age: 63
End: 2023-10-02
Payer: COMMERCIAL

## 2023-10-02 RX ORDER — SEMAGLUTIDE 1.34 MG/ML
1 INJECTION, SOLUTION SUBCUTANEOUS
Qty: 3 ML | Refills: 1 | Status: SHIPPED | OUTPATIENT
Start: 2023-10-02

## 2023-10-15 RX ORDER — PANTOPRAZOLE SODIUM 40 MG/1
40 TABLET, DELAYED RELEASE ORAL DAILY
Qty: 90 TABLET | Refills: 0 | Status: CANCELLED | OUTPATIENT
Start: 2023-10-15

## 2023-10-15 RX ORDER — LEVOTHYROXINE SODIUM 0.07 MG/1
75 TABLET ORAL DAILY
Qty: 90 TABLET | Refills: 0 | Status: CANCELLED | OUTPATIENT
Start: 2023-10-15

## 2023-10-15 RX ORDER — CITALOPRAM 20 MG/1
20 TABLET ORAL DAILY
Qty: 90 TABLET | Refills: 0 | Status: CANCELLED | OUTPATIENT
Start: 2023-10-15

## 2023-10-15 RX ORDER — ATORVASTATIN CALCIUM 40 MG/1
40 TABLET, FILM COATED ORAL DAILY
Qty: 90 TABLET | Refills: 0 | Status: CANCELLED | OUTPATIENT
Start: 2023-10-15

## 2023-10-15 RX ORDER — EMPAGLIFLOZIN 25 MG/1
25 TABLET, FILM COATED ORAL DAILY
Qty: 90 TABLET | Refills: 0 | Status: CANCELLED | OUTPATIENT
Start: 2023-10-15

## 2023-10-16 RX ORDER — PANTOPRAZOLE SODIUM 40 MG/1
40 TABLET, DELAYED RELEASE ORAL DAILY
Qty: 90 TABLET | Refills: 0 | Status: SHIPPED | OUTPATIENT
Start: 2023-10-16

## 2023-10-16 RX ORDER — CITALOPRAM 20 MG/1
20 TABLET ORAL DAILY
Qty: 90 TABLET | Refills: 0 | Status: SHIPPED | OUTPATIENT
Start: 2023-10-16

## 2023-10-16 RX ORDER — ATORVASTATIN CALCIUM 40 MG/1
40 TABLET, FILM COATED ORAL DAILY
Qty: 90 TABLET | Refills: 0 | Status: SHIPPED | OUTPATIENT
Start: 2023-10-16

## 2023-10-16 RX ORDER — LEVOTHYROXINE SODIUM 0.07 MG/1
75 TABLET ORAL DAILY
Qty: 90 TABLET | Refills: 0 | Status: SHIPPED | OUTPATIENT
Start: 2023-10-16

## 2023-12-28 RX ORDER — SEMAGLUTIDE 1.34 MG/ML
1 INJECTION, SOLUTION SUBCUTANEOUS
Qty: 3 ML | Refills: 1 | OUTPATIENT
Start: 2023-12-28

## 2023-12-29 NOTE — TELEPHONE ENCOUNTER
Ok hub to relay lvm to pt, pt must be seen in office and complete labs for medication refill if he has not established with new PCP yet. If pt has established with new PCP, he must contact new PCP for medication refill.

## 2024-07-09 ENCOUNTER — OFFICE VISIT (OUTPATIENT)
Dept: CARDIOLOGY | Facility: CLINIC | Age: 64
End: 2024-07-09
Payer: COMMERCIAL

## 2024-07-09 VITALS
RESPIRATION RATE: 18 BRPM | SYSTOLIC BLOOD PRESSURE: 127 MMHG | WEIGHT: 217 LBS | HEIGHT: 68 IN | BODY MASS INDEX: 32.89 KG/M2 | HEART RATE: 74 BPM | DIASTOLIC BLOOD PRESSURE: 82 MMHG

## 2024-07-09 DIAGNOSIS — I25.10 CORONARY ARTERY DISEASE INVOLVING NATIVE CORONARY ARTERY OF NATIVE HEART, UNSPECIFIED WHETHER ANGINA PRESENT: Primary | ICD-10-CM

## 2024-07-09 RX ORDER — ATORVASTATIN CALCIUM 80 MG/1
80 TABLET, FILM COATED ORAL DAILY
Qty: 90 TABLET | Refills: 3 | Status: SHIPPED | OUTPATIENT
Start: 2024-07-09

## 2024-07-09 RX ORDER — GLIPIZIDE 5 MG/1
5 TABLET ORAL
COMMUNITY
Start: 2024-07-09 | End: 2025-07-09

## 2024-07-09 RX ORDER — EZETIMIBE 10 MG/1
10 TABLET ORAL DAILY
Qty: 90 TABLET | Refills: 3 | Status: SHIPPED | OUTPATIENT
Start: 2024-07-09

## 2024-07-10 ENCOUNTER — PATIENT ROUNDING (BHMG ONLY) (OUTPATIENT)
Dept: CARDIOLOGY | Facility: CLINIC | Age: 64
End: 2024-07-10
Payer: COMMERCIAL
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
## 2025-05-14 NOTE — PROGRESS NOTES
11/26/2019  Foot and Ankle Surgery - New Patient   Provider: Dr. Prashant Klein DPM  Location: [facility]    Subjective:  Vinnie Cloud is a 58 y.o. male.     Chief Complaint   Patient presents with   • Left Foot - Ingrown Toenail       HPI: Patient is a 58-year-old male that presents with recent pain involving the left great toe.  He did have significant redness swelling and pain involving the distal aspect of the digit.  He is unaware of any injury, but states that he did start wearing steel toed shoes and feels that he may have caused problems with the nail.  He has had longstanding discoloration, thickness, and overall fungal appearance for several years.  He has not had previous concerns for ingrown nail or other issues.  Today, the symptoms have improved.  He is able to perform normal daily activities without significant pain or limitation.  He was performing Epsom salt soaks.  No other issues today    No Known Allergies    Past Medical History:   Diagnosis Date   • A-fib (CMS/[facility]) 2017   • CAD (coronary artery disease)    • HLD (hyperlipidemia)    • HTN (hypertension)    • Hx of CABG 01/2017   • Hypothyroidism        Past Surgical History:   Procedure Laterality Date   • CORONARY ARTERY BYPASS GRAFT  01/2017   • TRIGGER FINGER RELEASE      thumb       Family History   Problem Relation Age of Onset   • Cancer Mother    • Diabetes Mother    • Heart disease Mother    • Hypertension Mother    • No Known Problems Father        Social History     Socioeconomic History   • Marital status:      Spouse name: Not on file   • Number of children: Not on file   • Years of education: Not on file   • Highest education level: Not on file   Tobacco Use   • Smoking status: Never Smoker   • Smokeless tobacco: Never Used   Substance and Sexual Activity   • Alcohol use: Yes     Alcohol/week: 1.8 oz     Types: 3 Cans of beer per week     Comment: weeky   • Drug use: Defer   • Sexual activity: Defer     Chief Complaint   Patient presents with    Follow-up     Open wound right breast    1. Have you been to the ER, urgent care clinic since your last visit?  Hospitalized since your last visit?No    2. Have you seen or consulted any other health care providers outside of the [facility] since your last visit?  Include any pap smears or colon screening. No    "    Current Outpatient Medications on File Prior to Visit   Medication Sig Dispense Refill   • ASPIRIN 81 PO Take 81 mg by mouth.     • atorvastatin (LIPITOR) 40 MG tablet Take 40 mg by mouth Daily.     • cetirizine (zyrTEC) 10 MG tablet Take 10 mg by mouth Daily.     • levothyroxine (SYNTHROID, LEVOTHROID) 75 MCG tablet Take 75 mcg by mouth Daily.     • lisinopril (PRINIVIL,ZESTRIL) 5 MG tablet Take 1 tablet by mouth Daily. 30 tablet 11   • metFORMIN ER (GLUCOPHAGE-XR) 500 MG 24 hr tablet TK 2 TS PO QPM WITH DINNER  3   • metoprolol succinate XL (TOPROL-XL) 25 MG 24 hr tablet TK 1 T PO  BID WITH FOOD  1   • [DISCONTINUED] metoprolol tartrate (LOPRESSOR) 25 MG tablet Take 25 mg by mouth 2 (Two) Times a Day.       No current facility-administered medications on file prior to visit.        Review of Systems:  General: Denies fever, chills, fatigue, and weakness.  Eyes: Denies vision loss, blurry vision, and excessive redness.  ENT: Denies hearing issues and difficulty swallowing.  Cardiovascular: Denies palpitations, chest pain, or syncopal episodes.  Respiratory: Denies shortness of breath, wheezing, and coughing.  GI: Denies abdominal pain, nausea, and vomiting.   : Denies frequency, hematuria, and urgency.  Musculoskeletal: Denies muscle cramps, joint pains, and stiffness.  Derm: + Fungal nails  Neuro: Denies headaches, numbness, loss of coordination, and tremors.  Psych: Denies anxiety and depression.  Endocrine: Denies temperature intolerance and changes in appetite.  Heme: Denies bleeding disorders or abnormal bruising.     Objective   /95   Pulse 73   Ht 172.7 cm (68\")   Wt 107 kg (236 lb)   BMI 35.88 kg/m²     Foot/Ankle Exam:       General:   Appearance: appears stated age and healthy    Orientation: AAOx3    Affect: appropriate    Gait: unimpaired       Left Foot/Ankle:      Vascular   Normal vascular exam    Dorsalis pedis:  2+  Posterior tibial:  2+  Skin Temperature: warm    Edema Grading:  " None  CFT:  < 3 seconds  Pedal Hair Growth:  Present  Varicosities: none       Neurologic   Normal sensation    Light touch sensation:  Normal  Vibratory sensation:  Normal  Hot/cold sensation: normal    Achilles reflex:  2+     Musculoskeletal   Ecchymosis:  None  Tenderness: none    Arch:  Normal     Muscle Strength   Normal strength    Foot dorsiflexion:  5  Foot plantar flexion:  5  Foot inversion:  5  Foot eversion:  5     Range of Motion   Foot and ankle ROM within normal limits       Dermatologic   Skin: skin intact    Nails: onychomycosis, abnormally thick and dystrophic nails    Nails comment:  No periungual erythema or signs of infection      Assessment/Plan   Vinnie was seen today for ingrown toenail.    Diagnoses and all orders for this visit:    Onychomycosis      Patient presents after recent episode of redness, swelling, and pain involving the eponychial region of the left great toe.  Today, he denies any symptoms and states that he is doing well.  He has not had issues like this in the past and feels that it was just attributed to wearing the steel toed shoes.  He does have thickness and dystrophy consistent with fungal nail.  We did discuss the diagnosis and various treatment options.  Given that he is asymptomatic, he would like to continue to observe.  I do feel that this is appropriate.  We did discuss conservative care options.  I have asked that he monitor and call with any issues.  I will see him on an as-needed basis at this time    No orders of the defined types were placed in this encounter.       Note is dictated utilizing voice recognition software. Unfortunately this leads to occasional typographical errors. I apologize in advance if the situation occurs. If questions occur please do not hesitate to call our office.

## 2025-07-09 RX ORDER — EZETIMIBE 10 MG/1
10 TABLET ORAL DAILY
Qty: 90 TABLET | Refills: 0 | Status: SHIPPED | OUTPATIENT
Start: 2025-07-09

## 2025-07-09 RX ORDER — ATORVASTATIN CALCIUM 80 MG/1
80 TABLET, FILM COATED ORAL DAILY
Qty: 90 TABLET | Refills: 0 | Status: SHIPPED | OUTPATIENT
Start: 2025-07-09

## 2025-07-25 NOTE — PROGRESS NOTES
Cardiology Office Follow Up Visit      Primary Care Provider:  Anita Berg APRN    Reason for f/u:     1 yr f/u      Subjective     CC:    Pt has no c/o, routine f/u    History of Present Illness       Vinnie Cloud is a 64 y.o. male patient of Dr Gongora. He has a hx of 3 vessel bypass in Jan 2017 Dr Ruiz, accompanied by some post-op Afib.     Pt was seen by Dr Gonogra last in July 2024. He was doing fairly well at that time. Discussed increasing his atorvastatin to 80 daily with addition of Zetia for goal LDL less than 55, which he remains on. Pt states Wife who is RN  looked at labs by PCP and pt was in range. On Jardiance for DM2, he says last A1C was 6.9. Last echo 2019 EF 56%, LV systolic function normal.  No ischemic eval since his CABG, however patient has no complaints of chest pain.  He is extremely active, doing [employer] many marathons.  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  He states he has been sleeping more than usual, however when his labs were recently checked his iron was a little low when he was started on supplementation recently.  His only other complaint is some left shoulder pain which he thinks is arthritis.    No changes to current therapeutic regimen.  No indication for ischemic evaluation or repeat echo at this point.  Advised him to contact our office for any changes in symptoms including chest pain decreased endurance or shortness of breath.  Otherwise follow-up in 1 year.      ASSESSMENT/PLAN:      Diagnoses and all orders for this visit:    1. Hx of CABG (Primary)    2. Coronary artery disease involving native coronary artery of native heart without angina pectoris    3. Hyperlipidemia, unspecified hyperlipidemia type    4. Primary hypertension    5. Type 2 diabetes mellitus without complication, without long-term current use of  insulin        MEDICAL DECISION MAKING:    CAD with multivessel bypass January 2017  Post-op AF, today sinus rhythm rate 68  Hyperlipidemia, continues on Lipitor 80 mg and Zetia 10 mg, labs monitored by PCP  Essential hypertension, controlled blood pressure today 121/77, not on any antihypertensives     Continue present pharmacotherapy, secondary prevention, aspirin high intensity statin Zetia  Diet and exercise per AHA guidelines  Secondary prevention goals-continue strict glycemic control     Back to clinic 1 year, 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     HLD (hyperlipidemia)     HTN (hypertension)     Hx of CABG 01/2017    Hypothyroidism        Past Surgical History:   Procedure Laterality Date    CORONARY ARTERY BYPASS GRAFT  01/2017    ENDOSCOPY N/A 3/15/2020    Procedure: ESOPHAGOGASTRODUODENOSCOPY WITH DILATATION (BALLOON TO 18);  Surgeon: Jai Castaneda MD;  Location: [facility] ENDOSCOPY;  Service: Gastroenterology;  Laterality: N/A;  NO FOREIGN BODY, FOOD IN STOMACH, DUODENITIS, ESOPHAGITIS, NO STRICTURE    TRIGGER FINGER RELEASE      thumb         Current Outpatient Medications:     aspirin (ASPIR) 81 MG EC tablet, Take 1 tablet by mouth Daily., Disp: , Rfl:     atorvastatin (LIPITOR) 80 MG tablet, Take 1 tablet by mouth once daily, Disp: 90 tablet, Rfl: 0    citalopram (CeleXA) 20 MG tablet, Take 1 tablet by mouth Daily., Disp: 90 tablet, Rfl: 0    empagliflozin (JARDIANCE) 25 MG tablet tablet, Take 1 tablet by mouth Daily., Disp: 90 tablet, Rfl: 0    ezetimibe (ZETIA) 10 MG tablet, Take 1 tablet by mouth once daily, Disp: 90 tablet, Rfl: 0    glipizide (GLUCOTROL) 5 MG tablet, Take 1 tablet by mouth 2 (Two) Times a Day Before Meals., Disp: , Rfl:     levothyroxine (SYNTHROID, LEVOTHROID)  75 MCG tablet, Take 1 tablet by mouth Daily., Disp: 90 tablet, Rfl: 0    pantoprazole (Protonix) 40 MG EC tablet, Take 1 tablet by mouth Daily., Disp: 90 tablet, Rfl: 0    Semaglutide, 1 MG/DOSE, (Ozempic, 1 MG/DOSE,) 4 MG/3ML solution pen-injector, Inject 1 mg under the skin into the appropriate area as directed Every 7 (Seven) Days., Disp: 3 mL, Rfl: 1    Social History     Socioeconomic History    Marital status:    Tobacco Use    Smoking status: Never    Smokeless tobacco: Never   Vaping Use    Vaping status: Never Used   Substance and Sexual Activity    Alcohol use: Yes     Comment: socially    Drug use: Never    Sexual activity: Not Currently       Family History   Problem Relation Age of Onset    Cancer Mother     Diabetes Mother     Heart disease Mother     Hypertension Mother     Stroke Father        The following portions of the patient's history were reviewed and updated as appropriate: allergies, current medications, past family history, past medical history, past social history, past surgical history and problem list.    ROS    Pertinent items are noted in HPI, all other systems reviewed and negative    There were no vitals taken for this visit..  Objective     Physical Exam  General Appearance: Alert, in no acute distress  Head:   Normocephalic, atraumatic  Eyes:    PERRLA, EOM intact, conjunctivae and sclerae normal, no  icterus  Throat: Oral mucosa moist  Neck:No carotid bruit or JVD  Lungs:  Clear to auscultation, respirations regular, even and unlabored   Heart:  Regular rhythm and normal rate, normal S1 and S2, no  murmur, no gallop, no rub, no click  Chest Wall:  No abnormalities observed  Abdomen:  Soft,  non-tender, non-distended, no guarding, no rebound  tenderness  Extremities:No edema, no cyanosis or redness  Pulses:Pulses palpable and equal bilaterally in all extremities  Skin:  No bleeding, bruising or rash   Neurologic:  Normal mood, thought content and behavior      ECG 12  "Lead    Date/Time: 8/1/2025 12:25 PM  Performed by: Su Iverson APRN    Authorized by: Su Iverson APRN  Comparison: compared with previous ECG from 7/9/2024  Similar to previous ECG  Rhythm: sinus rhythm  Rate: normal  BPM: 68  Conduction: conduction normal  QRS axis: normal          EKG ordered by and reviewed by me in office       No results found for: \"PROBNP\"       Lab Results   Component Value Date    TSH 1.420 07/06/2023      No results found for: \"FREET4\"   No results found for: \"DDIMERQUANT\"  No results found for: \"MG\"   No results found for: \"DIGOXIN\"   No results found for: \"TROPONINT\"          No results found for: \"POCTROP\"    Results for orders placed in visit on 07/15/19    Adult Transthoracic Echo Complete W/ Cont if Necessary Per Protocol 07/16/2019  3:29 PM    Interpretation Summary  · Left ventricular systolic function is normal.     No results found for this or any previous visit.    Vinnie ROMEO Pedro Luis  reports that he has never smoked. He has never used smokeless tobacco.     "

## 2025-08-01 ENCOUNTER — OFFICE VISIT (OUTPATIENT)
Dept: CARDIOLOGY | Facility: CLINIC | Age: 65
End: 2025-08-01
Payer: COMMERCIAL

## 2025-08-01 VITALS
DIASTOLIC BLOOD PRESSURE: 77 MMHG | HEIGHT: 68 IN | BODY MASS INDEX: 35.92 KG/M2 | RESPIRATION RATE: 18 BRPM | OXYGEN SATURATION: 97 % | WEIGHT: 237 LBS | SYSTOLIC BLOOD PRESSURE: 121 MMHG | HEART RATE: 71 BPM

## 2025-08-01 DIAGNOSIS — Z95.1 HX OF CABG: Primary | ICD-10-CM

## 2025-08-01 DIAGNOSIS — I25.10 CORONARY ARTERY DISEASE INVOLVING NATIVE CORONARY ARTERY OF NATIVE HEART WITHOUT ANGINA PECTORIS: Chronic | ICD-10-CM

## 2025-08-01 DIAGNOSIS — E78.5 HYPERLIPIDEMIA, UNSPECIFIED HYPERLIPIDEMIA TYPE: Chronic | ICD-10-CM

## 2025-08-01 DIAGNOSIS — E11.9 TYPE 2 DIABETES MELLITUS WITHOUT COMPLICATION, WITHOUT LONG-TERM CURRENT USE OF INSULIN: Chronic | ICD-10-CM

## 2025-08-01 DIAGNOSIS — I10 PRIMARY HYPERTENSION: Chronic | ICD-10-CM

## 2025-08-01 RX ORDER — MELOXICAM 15 MG/1
15 TABLET ORAL DAILY
COMMUNITY
Start: 2025-07-16 | End: 2025-10-14

## 2025-08-01 RX ORDER — FERROUS SULFATE 325(65) MG
325 TABLET, DELAYED RELEASE (ENTERIC COATED) ORAL 3 TIMES WEEKLY
COMMUNITY
Start: 2025-07-21 | End: 2025-10-19

## (undated) DEVICE — BITEBLOCK ENDO W/STRAP 60F A/ LF DISP

## (undated) DEVICE — ESOPHAGEAL BALLOON DILATATION CATHETER: Brand: CRE FIXED WIRE

## (undated) DEVICE — PK ENDO GI 50

## (undated) DEVICE — PAPR PRNT PK SONY W RIBN UPC55

## (undated) DEVICE — DEV INFL BALN BIG60 W/GAUGE 60ML